# Patient Record
Sex: FEMALE | Race: BLACK OR AFRICAN AMERICAN | NOT HISPANIC OR LATINO | Employment: PART TIME | URBAN - METROPOLITAN AREA
[De-identification: names, ages, dates, MRNs, and addresses within clinical notes are randomized per-mention and may not be internally consistent; named-entity substitution may affect disease eponyms.]

---

## 2017-01-19 DIAGNOSIS — R10.13 DYSPEPSIA: Primary | ICD-10-CM

## 2017-01-19 NOTE — TELEPHONE ENCOUNTER
Date of last visit at clinic: 08.30.16    Please complete refill and CLOSE ENCOUNTER.  Closing the encounter signifies the refill is complete.

## 2017-01-22 RX ORDER — PANTOPRAZOLE SODIUM 40 MG/1
40 TABLET, DELAYED RELEASE ORAL DAILY
Qty: 30 TABLET | Refills: 3 | Status: SHIPPED
Start: 2017-01-22 | End: 2017-05-25

## 2017-02-27 ENCOUNTER — OFFICE VISIT (OUTPATIENT)
Dept: FAMILY MEDICINE | Facility: CLINIC | Age: 67
End: 2017-02-27

## 2017-02-27 VITALS
RESPIRATION RATE: 16 BRPM | HEART RATE: 88 BPM | TEMPERATURE: 98 F | BODY MASS INDEX: 35.14 KG/M2 | SYSTOLIC BLOOD PRESSURE: 144 MMHG | WEIGHT: 174 LBS | DIASTOLIC BLOOD PRESSURE: 87 MMHG | OXYGEN SATURATION: 95 %

## 2017-02-27 DIAGNOSIS — B18.1 CHRONIC VIRAL HEPATITIS B WITHOUT DELTA AGENT AND WITHOUT COMA (H): ICD-10-CM

## 2017-02-27 DIAGNOSIS — Z86.69 H/O VISUAL FIELD DEFECT: ICD-10-CM

## 2017-02-27 DIAGNOSIS — J30.89 SEASONAL ALLERGIC RHINITIS DUE TO OTHER ALLERGIC TRIGGER: ICD-10-CM

## 2017-02-27 DIAGNOSIS — R53.83 FATIGUE, UNSPECIFIED TYPE: Primary | ICD-10-CM

## 2017-02-27 LAB
% GRANULOCYTES: 54.7 %G (ref 40–75)
GRANULOCYTES #: 3.3 K/UL (ref 1.6–8.3)
HCT VFR BLD AUTO: 43.2 % (ref 35–47)
HEMOGLOBIN: 13.7 G/DL (ref 11.7–15.7)
LYMPHOCYTES # BLD AUTO: 2.2 K/UL (ref 0.8–5.3)
LYMPHOCYTES NFR BLD AUTO: 36.7 %L (ref 20–48)
MCH RBC QN AUTO: 28.5 PG (ref 26.5–35)
MCHC RBC AUTO-ENTMCNC: 31.7 G/DL (ref 32–36)
MCV RBC AUTO: 89.9 FL (ref 78–100)
MID #: 0.5 K/UL (ref 0–2.2)
MID %: 8.6 %M (ref 0–20)
PLATELET # BLD AUTO: 363 K/UL (ref 150–450)
RBC # BLD AUTO: 4.8 M/UL (ref 3.8–5.2)
WBC # BLD AUTO: 6 K/UL (ref 4–11)

## 2017-02-27 RX ORDER — LORATADINE 10 MG/1
10 TABLET ORAL DAILY
Qty: 30 TABLET | Refills: 1 | Status: SHIPPED | OUTPATIENT
Start: 2017-02-27 | End: 2017-10-04

## 2017-02-27 RX ORDER — FLUTICASONE PROPIONATE 50 MCG
1-2 SPRAY, SUSPENSION (ML) NASAL DAILY
Qty: 3 BOTTLE | Refills: 1 | Status: SHIPPED | OUTPATIENT
Start: 2017-02-27 | End: 2017-10-04

## 2017-02-27 NOTE — PROGRESS NOTES
HPI:       Dequan Capps is a 67 year old who presents for the following  Patient presents with:  Headache    Not feeling well for a while. Wants to check liver, kidney, diabetes, thyroid, infection. Has pain in throat, nose, ear.     Pain started from the throat, goes to the ear. Has dizziness. Sometimes loses balance. Feels falling down. Has not had a check up in more than a year. Keeps losing weight.  Has had these symptoms for the last 2 months. Sometimes feels ticklish, starts coughing. The pain then moves to the ears. Starts having dizziness.  Feels like decreased hearing in right ear. Sometimes when standing, she feels that she is not standing on flat floor. No sensation of room spinning. Overall symptoms were insidious in onset. Started with sore throat, hoarse voice, coughing. The cough is better. Still has the sore throat, ear pain, dizziness.     Wants to get liver panel done. Says that she gets it done every year.     A [employer]  was used for  this visit.      Problem, Medication and Allergy Lists were reviewed and are current.  Patient is an established patient of this clinic.         Review of Systems:   Review of Systems   Constitutional: Positive for fatigue and unexpected weight change. Negative for fever.   HENT: Positive for ear pain, hearing loss, sneezing and sore throat.    Respiratory: Positive for cough. Negative for shortness of breath.    Cardiovascular: Negative for chest pain.   Gastrointestinal: Negative for abdominal pain, nausea and vomiting.   Genitourinary: Negative for dysuria.   Neurological: Positive for dizziness. Negative for syncope.             Physical Exam:   Patient Vitals for the past 24 hrs:   BP Temp Temp src Pulse Resp SpO2 Weight   02/27/17 1622 144/87 - - - - - -   02/27/17 1621 157/82 98  F (36.7  C) Oral 88 16 95 % 174 lb (78.9 kg)     Body mass index is 35.14 kg/(m^2).  Vitals were reviewed and were normal     Physical Exam   Constitutional: She  appears well-developed.   HENT:   Head: Normocephalic and atraumatic.   Right Ear: External ear normal.   Left Ear: External ear normal.   Mouth/Throat: Posterior oropharyngeal erythema present.   Eyes: Conjunctivae are normal. Pupils are equal, round, and reactive to light.   Cardiovascular: Normal rate and normal heart sounds.    Pulmonary/Chest: Effort normal and breath sounds normal.   Abdominal: Soft. Bowel sounds are normal.   Musculoskeletal: Normal range of motion.   Neurological: She is alert. No cranial nerve deficit. Coordination normal.   Skin: Skin is warm.   Psychiatric: She has a normal mood and affect.         Results:     Component      Latest Ref Rng & Units 2/27/2017   Albumin      3.5 - 4.7 mg/dL 4.1   Alkaline Phosphatase      31.7 - 110.5 U/L 84.2   ALT      0.0 - 45.0 U/L 16.3   AST      0.0 - 45.0 U/L 19.7   Bilirubin Total      0.2 - 1.3 mg/dL <0.4   Urea Nitrogen      7.0 - 19.0 mg/dL 12.3   Calcium      8.5 - 10.1 mg/dL 8.9   Chloride      98.0 - 110.0 mmol/L 104.7   Carbon Dioxide      20.0 - 32.0 mmol/L 23.7   Creatinine      0.5 - 1.0 mg/dL 0.6   Glucose      70.0 - 99.0 mg'dL 94.6   Potassium      3.3 - 4.5 mmol/dL 3.5   Sodium      132.6 - 141.4 mmol/L 137.6   Protein Total      6.8 - 8.8 g/dL 7.6   GFR Estimate      mL/min/1.7 m2 106.6   GFR Estimate If Black      mL/min/1.7 m2 129.0   WBC      4.0 - 11.0 K/uL 6.0   Lymphocytes #      0.8 - 5.3 K/uL 2.2   % Lymphocytes      20.0 - 48.0 %L 36.7   Mid #      0.0 - 2.2 K/uL 0.5   Mid %      0.0 - 20.0 %M 8.6   GRANULOCYTES #      1.6 - 8.3 K/uL 3.3   % Granulocytes      40.0 - 75.0 %G 54.7   RBC      3.80 - 5.20 M/uL 4.80   Hemoglobin      11.7 - 15.7 g/dL 13.7   Hematocrit      35.0 - 47.0 % 43.2   MCV      78.0 - 100.0 fL 89.9   MCH      26.5 - 35.0 pg 28.5   MCHC      32.0 - 36.0 g/dL 31.7 (L)   Platelets      150.0 - 450.0 K/uL 363.0   Hemoglobin A1C      4.1 - 5.7 % 5.4   TSH      0.40 - 4.00 mU/L 2.75   Vitamin D Deficiency  "screening      20 - 75 ug/L 26     Assessment and Plan     1. Fatigue, unspecified type  2. Chronic viral hepatitis B without delta agent and without coma (H)  3. Seasonal allergic rhinitis due to other allergic trigger  Patient here with multiple vague complaints. Demanding an extensive workup. Her exam is not suggestive of any underlying pathology. Her lab work up has returned normal. Most likely suffering from a viral syndrome vs allergic rhinitis, pharyngitis. Tried to point this out to patient but she still requested work up. Treatment for allergic rhinitis prescribed. Return to clinic if symptoms do not improve or worsen.     - CBC with Diff Plt ([facility])  - Hemoglobin A1c ([facility])  - TSH with free T4 reflex  - Vitamin D Deficiency  - Comprehensive Metabolic Panel ([facility])  - loratadine (CLARITIN) 10 MG tablet; Take 1 tablet (10 mg) by mouth daily  Dispense: 30 tablet; Refill: 1  - fluticasone (FLONASE) 50 MCG/ACT spray; Spray 1-2 sprays into both nostrils daily  Dispense: 3 Bottle; Refill: 1    4. H/O visual field defect  Patient was last seen by opthalmology in Sept 2016. Recommendation was to get an MRI done and then follow up. I am worried that her visual impairment has some contribution to her feeling of imbalance. Her neurologic exam was negative for any cerebellar disease. She was unable to go through MRI because she says she \"felt scared\". Offered to prescribe valium to take prior to the MRI, patient refused. She says she wants to give it another chance. Stressed on the importance of getting the MRI and follow up with opthalmology.    There are no discontinued medications.  Options for treatment and follow-up care were reviewed with the patient. Dequan Capps  engaged in the decision making process and verbalized understanding of the options discussed and agreed with the final plan.    Bridgette Méndez MD      "

## 2017-02-27 NOTE — LETTER
March 3, 2017      Dequan Capps  [address] [address]        Dear Dequan,    Thank you for getting your care at [facility]. Please see below for your test results.    Resulted Orders   CBC with Diff Plt (\[facility] [place] [facility]\"")   Result Value Ref Range    WBC 6.0 4.0 - 11.0 K/uL    Lymphocytes # 2.2 0.8 - 5.3 K/uL    % Lymphocytes 36.7 20.0 - 48.0 %L    Mid # 0.5 0.0 - 2.2 K/uL    Mid % 8.6 0.0 - 20.0 %M    GRANULOCYTES # 3.3 1.6 - 8.3 K/uL    % Granulocytes 54.7 40.0 - 75.0 %G    RBC 4.80 3.80 - 5.20 M/uL    Hemoglobin 13.7 11.7 - 15.7 g/dL    Hematocrit 43.2 35.0 - 47.0 %    MCV 89.9 78.0 - 100.0 fL    MCH 28.5 26.5 - 35.0 pg    MCHC 31.7 (L) 32.0 - 36.0 g/dL    Platelets 363.0 150.0 - 450.0 K/uL   Hemoglobin A1c (\[facility]\"")   Result Value Ref Range    Hemoglobin A1C 5.4 4.1 - 5.7 %   Comprehensive Metabolic Panel (\[facility]\"")   Result Value Ref Range    Albumin 4.1 3.5 - 4.7 mg/dL    Alkaline Phosphatase 84.2 31.7 - 110.5 U/L    ALT 16.3 0.0 - 45.0 U/L    AST 19.7 0.0 - 45.0 U/L    Bilirubin Total <0.4 0.2 - 1.3 mg/dL    Urea Nitrogen 12.3 7.0 - 19.0 mg/dL    Calcium 8.9 8.5 - 10.1 mg/dL    Chloride 104.7 98.0 - 110.0 mmol/L    Carbon Dioxide 23.7 20.0 - 32.0 mmol/L    Creatinine 0.6 0.5 - 1.0 mg/dL    Glucose 94.6 70.0 - 99.0 mg'dL    Potassium 3.5 3.3 - 4.5 mmol/dL    Sodium 137.6 132.6 - 141.4 mmol/L    Protein Total 7.6 6.8 - 8.8 g/dL    GFR Estimate 106.6 mL/min/1.7 m2    GFR Estimate If Black 129.0 mL/min/1.7 m2   TSH with free T4 reflex   Result Value Ref Range    TSH 2.75 0.40 - 4.00 mU/L   Vitamin D Deficiency   Result Value Ref Range    Vitamin D Deficiency screening 26 20 - 75 ug/L      Comment:      Season, race, dietary intake, and treatment affect the concentration of   25-hydroxy-Vitamin D. Values may decrease during winter months and increase   during summer months. Values 20-29 ug/L may indicate Vitamin D insufficiency   and values <20 ug/L may indicate Vitamin D deficiency.    Vitamin D determination is routinely performed by an immunoassay specific for   25 hydroxyvitamin D3.  If an individual is on vitamin D2 (ergocalciferol)   supplementation, please specify 25 OH vitamin D2 and D3 level determination   by   LCMSMS test VITD23.         If you have any concerns about these results please call and leave a message for me or send a MyChart message to the clinic.    Sincerely,    Bridgette Méndez MD

## 2017-02-27 NOTE — MR AVS SNAPSHOT
After Visit Summary   2/27/2017    Dequan Capps    MRN: [id number]           Patient Information     Date Of Birth          1950        Visit Information        Provider Department      2/27/2017 4:20 PM Bridgette Méndez MD [facility]        Today's Diagnoses     Fatigue, unspecified type    -  1    Chronic viral hepatitis B without delta agent and without coma (H)        H/O visual field defect        Seasonal allergic rhinitis due to other allergic trigger          Care Instructions    Here is the plan from today's visit    1. Fatigue, unspecified type    - CBC with Diff Plt ([facility])  - Hemoglobin A1c ([facility])  - TSH with free T4 reflex  - Vitamin D Deficiency    2. Chronic viral hepatitis B without delta agent and without coma (H)    - Comprehensive Metabolic Panel ([facility])    3. H/O visual field defect      4. Seasonal allergic rhinitis due to other allergic trigger    - loratadine (CLARITIN) 10 MG tablet; Take 1 tablet (10 mg) by mouth daily  Dispense: 30 tablet; Refill: 1  - fluticasone (FLONASE) 50 MCG/ACT spray; Spray 1-2 sprays into both nostrils daily  Dispense: 3 Bottle; Refill: 1          Please call or return to clinic if your symptoms don't go away.    Follow up plan  Please make a clinic appointment for follow up with your primary physician Kimberly Green to follow up with results.    Thank you for coming to [facility] today.  Lab Testing:  **If you had lab testing today and your results are reassuring or normal they will be mailed to you or sent through [employer] within 7 days.   **If the lab tests need quick action we will call you with the results.  The phone number we will call with results is # [phone] (home) none (work). If this is not the best number please call our clinic and change the number.  Medication Refills:  If you need any refills please call your pharmacy and they will contact us.   If you need to  your refill at a new  pharmacy, please contact the new pharmacy directly. The new pharmacy will help you get your medications transferred faster.   Scheduling:  If you have any concerns about today's visit or wish to schedule another appointment please call our office during normal business hours [phone] (8-5:00 M-F)  If a referral was made to a [facility] and you don't get a call from central scheduling please call [phone].  If a Mammogram was ordered for you at [facility] call [phone] to schedule or change your appointment.  If you had an XRay/CT/Ultrasound/MRI ordered the number is [phone] to schedule or change your radiology appointment.   Medical Concerns:  If you have urgent medical concerns please call [phone] at any time of the day.  If you have a medical emergency please call [phone].          Follow-ups after your visit        Who to contact     Please call your clinic at [phone] to:    Ask questions about your health    Make or cancel appointments    Discuss your medicines    Learn about your test results    Speak to your doctor   If you have compliments or concerns about an experience at your clinic, or if you wish to file a complaint, please contact [facility] Patient Relations at [phone] or email us at [email or web address]         Additional Information About Your Visit        MyChart Information     [employer] is an electronic gateway that provides easy, online access to your medical records. With [employer], you can request a clinic appointment, read your test results, renew a prescription or communicate with your care team.     To sign up for [employer] visit the website at [email or web address]   You will be asked to enter the access code listed below, as well as some personal information. Please follow the directions to create your username and password.     Your access code is: [email or web address]  Expires: 5/28/2017  5:43 PM      Your access code will  in 90 days. If you need help or a new code, please contact your [facility] or call [phone] for assistance.        Care EveryWhere ID     This is your Care EveryWhere ID. This could be used by other organizations to access your [facility] medical records  [id number]        Your Vitals Were     Pulse Temperature Respirations Pulse Oximetry Breastfeeding? BMI (Body Mass Index)    88 98  F (36.7  C) (Oral) 16 95% No 35.14 kg/m2       Blood Pressure from Last 3 Encounters:   17 144/87   16 126/86   16 137/78    Weight from Last 3 Encounters:   17 174 lb (78.9 kg)   16 174 lb 12.8 oz (79.3 kg)   16 176 lb 9.6 oz (80.1 kg)              We Performed the Following     CBC with Diff Plt ([facility])     Comprehensive Metabolic Panel ([facility])     Hemoglobin A1c ([facility])     TSH with free T4 reflex     Vitamin D Deficiency          Today's Medication Changes          These changes are accurate as of: 17  5:43 PM.  If you have any questions, ask your nurse or doctor.               Start taking these medicines.        Dose/Directions    loratadine 10 MG tablet   Commonly known as:  CLARITIN   Used for:  Seasonal allergic rhinitis due to other allergic trigger   Started by:  Bridgette Méndez MD        Dose:  10 mg   Take 1 tablet (10 mg) by mouth daily   Quantity:  30 tablet   Refills:  1         These medicines have changed or have updated prescriptions.        Dose/Directions    * fluticasone 50 MCG/ACT spray   Commonly known as:  FLONASE   This may have changed:  Another medication with the same name was added. Make sure you understand how and when to take each.   Used for:  Sinus headache   Changed by:  Kimberly Green MD        Dose:  1-2 spray   Spray 1-2 sprays into both nostrils daily   Quantity:  16 g   Refills:  3       * fluticasone 50 MCG/ACT spray   Commonly known as:  FLONASE   This may have changed:   You were already taking a medication with the same name, and this prescription was added. Make sure you understand how and when to take each.   Used for:  Seasonal allergic rhinitis due to other allergic trigger   Changed by:  Bridgette Méndez MD        Dose:  1-2 spray   Spray 1-2 sprays into both nostrils daily   Quantity:  3 Bottle   Refills:  1       * Notice:  This list has 2 medication(s) that are the same as other medications prescribed for you. Read the directions carefully, and ask your doctor or other care provider to review them with you.         Where to get your medicines      These medications were sent to [facility] [address]  [address] [facility] [address]     Phone:  [phone]     fluticasone 50 MCG/ACT spray    loratadine 10 MG tablet                Primary Care Provider Office Phone # Fax #    Kimberly Green [phone]       [facility] [address]  [facility] [address]        Thank you!     Thank you for choosing [facility]  for your care. Our goal is always to provide you with excellent care. Hearing back from our patients is one way we can continue to improve our services. Please take a few minutes to complete the written survey that you may receive in the mail after your visit with us. Thank you!             Your Updated Medication List - Protect others around you: Learn how to safely use, store and throw away your medicines at www.disposemymeds.org.          This list is accurate as of: 2/27/17  5:43 PM.  Always use your most recent med list.                   Brand Name Dispense Instructions for use    acetaminophen 500 MG tablet    TYLENOL    60 tablet    Take 1-2 tablets (500-1,000 mg) by mouth every 6 hours as needed       amLODIPine 5 MG tablet    NORVASC    30 tablet    Take 1 tablet (5 mg) by mouth daily       cyclobenzaprine 5 MG tablet    FLEXERIL    20 tablet    Take 1  tablet (5 mg) by mouth nightly as needed for muscle spasms       DAILY ENOC Tabs     30 tablet    Take 1 tablet by mouth daily       * fluticasone 50 MCG/ACT spray    FLONASE    16 g    Spray 1-2 sprays into both nostrils daily       * fluticasone 50 MCG/ACT spray    FLONASE    3 Bottle    Spray 1-2 sprays into both nostrils daily       ibuprofen 600 MG tablet    ADVIL/MOTRIN    60 tablet    Take 1 tablet (600 mg) by mouth every 6 hours as needed for moderate pain       loratadine 10 MG tablet    CLARITIN    30 tablet    Take 1 tablet (10 mg) by mouth daily       pantoprazole 40 MG EC tablet    PROTONIX    30 tablet    Take 1 tablet (40 mg) by mouth daily Take 30-60 minutes before a meal.       polyvinyl alcohol 1.4 % ophthalmic solution    ARTIFICIAL TEARS    15 mL    Place 1 drop into both eyes as needed for dry eyes       potassium chloride 20 MEQ Packet    KLOR-CON    30 packet    Take 20 mEq by mouth daily       potassium chloride SA 20 MEQ CR tablet    K-DUR/KLOR-CON M     Take 20 mEq by mouth       * vitamin D 2000 UNITS Caps     90 capsule    Take 1 tablet by mouth daily.       * vitamin D 2000 UNITS tablet     100 tablet    Take 2,000 Units by mouth daily       * Notice:  This list has 4 medication(s) that are the same as other medications prescribed for you. Read the directions carefully, and ask your doctor or other care provider to review them with you.

## 2017-02-27 NOTE — PATIENT INSTRUCTIONS
Here is the plan from today's visit    1. Fatigue, unspecified type    - CBC with Diff Plt ([facility])  - Hemoglobin A1c ([facility])  - TSH with free T4 reflex  - Vitamin D Deficiency    2. Chronic viral hepatitis B without delta agent and without coma (H)    - Comprehensive Metabolic Panel ([facility])    3. H/O visual field defect      4. Seasonal allergic rhinitis due to other allergic trigger    - loratadine (CLARITIN) 10 MG tablet; Take 1 tablet (10 mg) by mouth daily  Dispense: 30 tablet; Refill: 1  - fluticasone (FLONASE) 50 MCG/ACT spray; Spray 1-2 sprays into both nostrils daily  Dispense: 3 Bottle; Refill: 1          Please call or return to clinic if your symptoms don't go away.    Follow up plan  Please make a clinic appointment for follow up with your primary physician Kimberly Green to follow up with results.    Thank you for coming to [facility] today.  Lab Testing:  **If you had lab testing today and your results are reassuring or normal they will be mailed to you or sent through [employer] within 7 days.   **If the lab tests need quick action we will call you with the results.  The phone number we will call with results is # [phone] (home) none (work). If this is not the best number please call our clinic and change the number.  Medication Refills:  If you need any refills please call your pharmacy and they will contact us.   If you need to  your refill at a new pharmacy, please contact the new pharmacy directly. The new pharmacy will help you get your medications transferred faster.   Scheduling:  If you have any concerns about today's visit or wish to schedule another appointment please call our office during normal business hours [phone] (8-5:00 M-F)  If a referral was made to a [facility] and you don't get a call from central scheduling please call [phone].  If a Mammogram was ordered for you at [facility] call [phone] to schedule or  change your appointment.  If you had an XRay/CT/Ultrasound/MRI ordered the number is [phone] to schedule or change your radiology appointment.   Medical Concerns:  If you have urgent medical concerns please call [phone] at any time of the day.  If you have a medical emergency please call [phone].

## 2017-02-27 NOTE — PROGRESS NOTES
Preceptor Attestation:   Patient seen and discussed with the resident.   Assessment and plan reviewed with resident and agreed upon.   Supervising Physician:  Carlos Rice MD  [facility]

## 2017-02-28 LAB
ALBUMIN SERPL-MCNC: 4.1 MG/DL (ref 3.5–4.7)
ALP SERPL-CCNC: 84.2 U/L (ref 31.7–110.5)
ALT SERPL-CCNC: 16.3 U/L (ref 0–45)
AST SERPL-CCNC: 19.7 U/L (ref 0–45)
BILIRUB SERPL-MCNC: <0.4 MG/DL (ref 0.2–1.3)
BUN SERPL-MCNC: 12.3 MG/DL (ref 7–19)
CALCIUM SERPL-MCNC: 8.9 MG/DL (ref 8.5–10.1)
CHLORIDE SERPLBLD-SCNC: 104.7 MMOL/L (ref 98–110)
CO2 SERPL-SCNC: 23.7 MMOL/L (ref 20–32)
CREAT SERPL-MCNC: 0.6 MG/DL (ref 0.5–1)
DEPRECATED CALCIDIOL+CALCIFEROL SERPL-MC: 26 UG/L (ref 20–75)
GFR SERPL CREATININE-BSD FRML MDRD: 106.6 ML/MIN/1.7 M2
GLUCOSE SERPL-MCNC: 94.6 MG'DL (ref 70–99)
HBA1C MFR BLD: 5.4 % (ref 4.1–5.7)
POTASSIUM SERPL-SCNC: 3.5 MMOL/DL (ref 3.3–4.5)
PROT SERPL-MCNC: 7.6 G/DL (ref 6.8–8.8)
SODIUM SERPL-SCNC: 137.6 MMOL/L (ref 132.6–141.4)
TSH SERPL DL<=0.005 MIU/L-ACNC: 2.75 MU/L (ref 0.4–4)

## 2017-03-04 ASSESSMENT — ENCOUNTER SYMPTOMS
ABDOMINAL PAIN: 0
DIZZINESS: 1
VOMITING: 0
SORE THROAT: 1
DYSURIA: 0
COUGH: 1
FATIGUE: 1
FEVER: 0
SHORTNESS OF BREATH: 0
UNEXPECTED WEIGHT CHANGE: 1
NAUSEA: 0

## 2017-03-06 ENCOUNTER — TELEPHONE (OUTPATIENT)
Dept: FAMILY MEDICINE | Facility: CLINIC | Age: 67
End: 2017-03-06

## 2017-03-10 ENCOUNTER — TELEPHONE (OUTPATIENT)
Dept: FAMILY MEDICINE | Facility: CLINIC | Age: 67
End: 2017-03-10

## 2017-03-15 NOTE — TELEPHONE ENCOUNTER
Had , call the patient again, to inform of upcoming MRI appointment scheduled for 3/17/17  Velma Cardoso MA

## 2017-04-14 ENCOUNTER — TELEPHONE (OUTPATIENT)
Dept: FAMILY MEDICINE | Facility: CLINIC | Age: 67
End: 2017-04-14

## 2017-04-14 NOTE — TELEPHONE ENCOUNTER
[facility] phone call message- patient requesting a refill:    Full Medication Name: amLODIPine (NORVASC) 5 MG tablet and Cholecalciferol (VITAMIN D) 2000 UNITS CAPS and     Dose:     Pharmacy confirmed as   [email or web address] - [place], [place] - [address]  [address] [facility] [address]  Phone: [phone] Fax: [phone]    [facility] [address] [address]  Phone: [phone] Fax: [phone]  : Yes    Additional Comments:  Patient is requesting refill of above medications.    OK to leave a message on voice mail? Yes    Primary language: Turkish      needed? Yes    Call taken on April 14, 2017 at 2:20 PM by Patience Danielle

## 2017-04-17 DIAGNOSIS — E55.9 AVITAMINOSIS D: ICD-10-CM

## 2017-04-17 DIAGNOSIS — I10 BENIGN ESSENTIAL HYPERTENSION: ICD-10-CM

## 2017-04-17 RX ORDER — MULTIVIT-MIN/IRON/FOLIC ACID/K 18-600-40
1 CAPSULE ORAL DAILY
Qty: 90 CAPSULE | Refills: 1 | Status: SHIPPED | OUTPATIENT
Start: 2017-04-17 | End: 2017-10-04

## 2017-04-17 RX ORDER — AMLODIPINE BESYLATE 5 MG/1
5 TABLET ORAL DAILY
Qty: 30 TABLET | Refills: 6 | Status: SHIPPED | OUTPATIENT
Start: 2017-04-17 | End: 2017-12-05

## 2017-05-25 DIAGNOSIS — R10.13 DYSPEPSIA: ICD-10-CM

## 2017-05-25 RX ORDER — PANTOPRAZOLE SODIUM 40 MG/1
40 TABLET, DELAYED RELEASE ORAL DAILY
Qty: 30 TABLET | Refills: 3 | Status: SHIPPED | OUTPATIENT
Start: 2017-05-25 | End: 2018-01-02

## 2017-05-25 NOTE — TELEPHONE ENCOUNTER
Request for medication refill:    Date of last visit at clinic: 2/27/17    Please complete refill if appropriate and CLOSE ENCOUNTER.    Closing the encounter signifies the refill is complete.    If refill has been denied, please complete the smart phrase .smirefuse and route it to the [facility] RN TRIAGE pool to inform the patient and the pharmacy.    Mary Oneal

## 2017-09-26 ENCOUNTER — OFFICE VISIT (OUTPATIENT)
Dept: FAMILY MEDICINE | Facility: CLINIC | Age: 67
End: 2017-09-26

## 2017-09-26 VITALS
HEART RATE: 80 BPM | BODY MASS INDEX: 35.14 KG/M2 | OXYGEN SATURATION: 97 % | DIASTOLIC BLOOD PRESSURE: 85 MMHG | TEMPERATURE: 97.6 F | RESPIRATION RATE: 18 BRPM | SYSTOLIC BLOOD PRESSURE: 139 MMHG | WEIGHT: 174 LBS

## 2017-09-26 DIAGNOSIS — H04.129 EYE DRYNESS: ICD-10-CM

## 2017-09-26 DIAGNOSIS — M25.50 MULTIPLE JOINT PAIN: Primary | ICD-10-CM

## 2017-09-26 DIAGNOSIS — H53.8 BLURRY VISION, RIGHT EYE: ICD-10-CM

## 2017-09-26 DIAGNOSIS — M79.10 MYALGIA: ICD-10-CM

## 2017-09-26 DIAGNOSIS — H57.11 EYE PAIN, RIGHT: ICD-10-CM

## 2017-09-26 DIAGNOSIS — Z00.00 HEALTH CARE MAINTENANCE: ICD-10-CM

## 2017-09-26 DIAGNOSIS — R53.82 CHRONIC FATIGUE: ICD-10-CM

## 2017-09-26 LAB
ALBUMIN SERPL-MCNC: 4.3 MG/DL (ref 3.5–4.7)
ALP SERPL-CCNC: 78.6 U/L (ref 31.7–110.5)
ALT SERPL-CCNC: 15.4 U/L (ref 0–45)
AST SERPL-CCNC: 17.6 U/L (ref 0–45)
BILIRUB SERPL-MCNC: 0.4 MG/DL (ref 0.2–1.3)
BUN SERPL-MCNC: 7.5 MG/DL (ref 7–19)
CALCIUM SERPL-MCNC: 9.1 MG/DL (ref 8.5–10.1)
CHLORIDE SERPLBLD-SCNC: 103.3 MMOL/L (ref 98–110)
CO2 SERPL-SCNC: 27.4 MMOL/L (ref 20–32)
CREAT SERPL-MCNC: 0.6 MG/DL (ref 0.5–1)
CRP SERPL-MCNC: 9.6 MG/L (ref 0–8)
DEPRECATED CALCIDIOL+CALCIFEROL SERPL-MC: 28 UG/L (ref 20–75)
ERYTHROCYTE [SEDIMENTATION RATE] IN BLOOD: 10 MM/HR (ref 0–30)
GFR SERPL CREATININE-BSD FRML MDRD: >90 ML/MIN/1.7 M2
GLUCOSE SERPL-MCNC: 104.2 MG'DL (ref 70–99)
HBA1C MFR BLD: 5.5 % (ref 4.1–5.7)
HCT VFR BLD AUTO: 43.9 % (ref 35–47)
HEMOGLOBIN: 14.2 G/DL (ref 11.7–15.7)
MCH RBC QN AUTO: 29.5 PG (ref 26.5–35)
MCHC RBC AUTO-ENTMCNC: 32.3 G/DL (ref 32–36)
MCV RBC AUTO: 91 FL (ref 78–100)
PLATELET # BLD AUTO: 355 K/UL (ref 150–450)
POTASSIUM SERPL-SCNC: 3.5 MMOL/DL (ref 3.3–4.5)
PROT SERPL-MCNC: 7.6 G/DL (ref 6.8–8.8)
RBC # BLD AUTO: 4.82 M/UL (ref 3.8–5.2)
SODIUM SERPL-SCNC: 137.7 MMOL/L (ref 132.6–141.4)
TSH SERPL DL<=0.005 MIU/L-ACNC: 2.83 MU/L (ref 0.4–4)
WBC # BLD AUTO: 4.9 K/UL (ref 4–11)

## 2017-09-26 RX ORDER — MULTIVITAMIN
1 TABLET ORAL DAILY
Qty: 30 TABLET | Refills: 0 | Status: SHIPPED | OUTPATIENT
Start: 2017-09-26 | End: 2017-10-04

## 2017-09-26 RX ORDER — CHOLECALCIFEROL (VITAMIN D3) 50 MCG
2000 TABLET ORAL DAILY
Qty: 100 TABLET | Refills: 3 | Status: SHIPPED | OUTPATIENT
Start: 2017-09-26

## 2017-09-26 RX ORDER — POLYVINYL ALCOHOL 14 MG/ML
1 SOLUTION/ DROPS OPHTHALMIC PRN
Qty: 15 ML | Refills: 3 | Status: SHIPPED | OUTPATIENT
Start: 2017-09-26

## 2017-09-26 NOTE — PROGRESS NOTES
Preceptor Attestation:   Patient seen and discussed with the resident.   Assessment and plan reviewed with resident and agreed upon.   Supervising Physician:  Carlos Rice MD  [facility]

## 2017-09-26 NOTE — MR AVS SNAPSHOT
After Visit Summary   9/26/2017    Dequan Capps    MRN: [id number]           Patient Information     Date Of Birth          1950        Visit Information        Provider Department      9/26/2017 9:40 AM Donte Wasserman MD [facility]        Today's Diagnoses     Multiple joint pain    -  1    Benign essential hypertension        Health care maintenance        Eye pain, right        Blurry vision, right eye        Eye dryness        Myalgia        Chronic fatigue          Care Instructions    Here is the plan from today's visit    Body aches:  Today we will do some lab work to check for your body aches.  You are due for a DEXA scan which looks at how strong your bones are.      Benign essential hypertension  - Multiple Vitamin (DAILY ENOC) TABS; Take 1 tablet by mouth daily  Dispense: 30 tablet; Refill: 0     Health care maintenance  - Cholecalciferol (VITAMIN D) 2000 UNITS tablet; Take 2,000 Units by mouth daily  Dispense: 100 tablet; Refill: 3    Shots:  Today you received the Tdap and pneumonia shot    Please call or return to clinic if your symptoms don't go away.    Follow up plan as needed    Thank you for coming to [facility] today.  Lab Testing:  **If you had lab testing today and your results are reassuring or normal they will be mailed to you or sent through [employer] within 7 days.   **If the lab tests need quick action we will call you with the results.  The phone number we will call with results is # [phone] (home) none (work). If this is not the best number please call our clinic and change the number.  Medication Refills:  If you need any refills please call your pharmacy and they will contact us.   If you need to  your refill at a new pharmacy, please contact the new pharmacy directly. The new pharmacy will help you get your medications transferred faster.   Scheduling:  If you have any concerns about today's visit or wish to schedule another  appointment please call our office during normal business hours [phone] (8-5:00 M-F)  If a referral was made to a [facility] and you don't get a call from central scheduling please call [phone].  If a Mammogram was ordered for you at [facility] call [phone] to schedule or change your appointment.  If you had an XRay/CT/Ultrasound/MRI ordered the number is [phone] to schedule or change your radiology appointment.   Medical Concerns:  If you have urgent medical concerns please call [phone] at any time of the day.            Follow-ups after your visit        Future tests that were ordered for you today     Open Future Orders        Priority Expected Expires Ordered    DX Hip/Pelvis/Spine (DEXA) Routine  2018            Who to contact     Please call your clinic at [phone] to:    Ask questions about your health    Make or cancel appointments    Discuss your medicines    Learn about your test results    Speak to your doctor   If you have compliments or concerns about an experience at your clinic, or if you wish to file a complaint, please contact [facility] Patient Relations at [phone] or email us at [email or web address]         Additional Information About Your Visit        [employer]anes[employer] Information     [employer] (formerly [employer]) is an electronic gateway that provides easy, online access to your medical records. With [employer] (formerly [employer]), you can request a clinic appointment, read your test results, renew a prescription or communicate with your care team.     To sign up for [employer] visit the website at [email or web address]   You will be asked to enter the access code listed below, as well as some personal information. Please follow the directions to create your username and password.     Your access code is: [email or web address]  Expires: 2017 10:17 AM     Your access code will  in 90 days. If you need help or a new code, please  contact your [facility] or call [phone] for assistance.        Care EveryWhere ID     This is your Care EveryWhere ID. This could be used by other organizations to access your [facility] medical records  [id number]        Your Vitals Were     Pulse Temperature Respirations Pulse Oximetry BMI (Body Mass Index)       80 97.6  F (36.4  C) (Oral) 18 97% 35.14 kg/m2        Blood Pressure from Last 3 Encounters:   09/26/17 139/85   02/27/17 144/87   08/30/16 126/86    Weight from Last 3 Encounters:   09/26/17 174 lb (78.9 kg)   02/27/17 174 lb (78.9 kg)   08/30/16 174 lb 12.8 oz (79.3 kg)              We Performed the Following     CBC with Plt ([facility])     Comprehensive Metabolic Panel ([facility])     CRP inflammation     Erythrocyte Sedimentation Rate ([facility])     Hemoglobin A1c ([facility])     TSH with free T4 reflex     Vitamin D Level          Where to get your medicines      These medications were sent to [facility], [address] [address]     Phone:  [phone]     DAILY ENOC Tabs    polyvinyl alcohol 1.4 % ophthalmic solution    vitamin D 2000 UNITS tablet          Primary Care Provider Office Phone # Fax #    Sonia Zarate [phone]       [facility] [address] [facility] [address]  [facility] [address]        Equal Access to Services     COSTA MYERS AH: Hadii ifeanyi atwoodo Soherminio, waaxda luqadaha, qaybta kaalmada cheli, wax dao jones. So [facility] [phone].    ATENCIÓN: Si habla español, tiene a lauren disposición servicios gratuitos de asistencia lingüística. Llame al [phone].    We comply with applicable federal civil rights laws and [place] laws. We do not discriminate on the basis of race, color, national origin, age, disability sex, sexual orientation or gender identity.            Thank you!     Thank you for choosing [facility]  for your care. Our goal is always to provide you with excellent care. Hearing back from our patients is one way we can continue to improve our services. Please take a few minutes to complete the written survey that you may receive in the mail after your visit with us. Thank you!             Your Updated Medication List - Protect others around you: Learn how to safely use, store and throw away your medicines at www.disposemymeds.org.          This list is accurate as of: 9/26/17 10:17 AM.  Always use your most recent med list.                   Brand Name Dispense Instructions for use Diagnosis    acetaminophen 500 MG tablet    TYLENOL    60 tablet    Take 1-2 tablets (500-1,000 mg) by mouth every 6 hours as needed    Headache(784.0)       amLODIPine 5 MG tablet    NORVASC    30 tablet    Take 1 tablet (5 mg) by mouth daily    Benign essential hypertension       cyclobenzaprine 5 MG tablet    FLEXERIL    20 tablet    Take 1 tablet (5 mg) by mouth nightly as needed for muscle spasms    Neck pain       DAILY ENOC Tabs     30 tablet    Take 1 tablet by mouth daily    Benign essential hypertension       * fluticasone 50 MCG/ACT spray    FLONASE    16 g    Spray 1-2 sprays into both nostrils daily    Sinus headache       * fluticasone 50 MCG/ACT spray    FLONASE    3 Bottle    Spray 1-2 sprays into both nostrils daily    Seasonal allergic rhinitis due to other allergic trigger       ibuprofen 600 MG tablet    ADVIL/MOTRIN    60 tablet    Take 1 tablet (600 mg) by mouth every 6 hours as needed for moderate pain    Right knee pain       loratadine 10 MG tablet    CLARITIN    30 tablet    Take 1 tablet (10 mg) by mouth daily    Seasonal allergic rhinitis due to other allergic trigger       pantoprazole 40 MG EC tablet    PROTONIX    30 tablet    Take 1 tablet (40 mg) by mouth daily Take 30-60 minutes before a meal.    Dyspepsia       polyvinyl alcohol 1.4 % ophthalmic solution    ARTIFICIAL TEARS     15 mL    Place 1 drop into both eyes as needed for dry eyes    Eye pain, right, Blurry vision, right eye, Eye dryness       potassium chloride 20 MEQ Packet    KLOR-CON    30 packet    Take 20 mEq by mouth daily    Benign essential hypertension       potassium chloride SA 20 MEQ CR tablet    K-DUR/KLOR-CON M     Take 20 mEq by mouth        * vitamin D 2000 UNITS Caps     90 capsule    Take 1 tablet by mouth daily    Avitaminosis D       * vitamin D 2000 UNITS tablet     100 tablet    Take 2,000 Units by mouth daily    Health care maintenance       * Notice:  This list has 4 medication(s) that are the same as other medications prescribed for you. Read the directions carefully, and ask your doctor or other care provider to review them with you.

## 2017-09-26 NOTE — PROGRESS NOTES
"      HPI:       Dequan Capps is a 67 year old who presents for the following  Patient presents with:  Dizziness: 1 month, lightheaded, weak, anxious  Musculoskeletal Problem: entire body, joint pain    Dequan reports 2 months of muscle and joint pain with dizziness and fatigue.   She states \"all my joints, all of them, everywhere,\" cause her pain. She then began to point at every joint to emphasize that each joint is painful. Joint pain is worse in both her knees with swelling at times. She reports morning stiffness in her knees, lasting 30mins-1hr.    Additional symptoms include sensation of heart skipping a beat, especially with physical exertion, chronic fatigue \"always tired.\" No skin rash    Immigrated from [place] in July 2004. 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     CCP and RF normal 12/23/2014    A [facility]  was used for  this visit.      Problem, Medication and Allergy Lists were reviewed and are current.  Patient is an established patient of this clinic.         Review of Systems:   Review of Systems   Constitutional: Positive for fatigue. Negative for chills, fever and unexpected weight change.   HENT: Negative for congestion, rhinorrhea and sore throat.    Eyes: Positive for visual disturbance (right eye).   Respiratory: Negative for cough and shortness of breath.    Cardiovascular: Negative for chest pain.   Gastrointestinal: Negative for abdominal pain, constipation, diarrhea, nausea and vomiting.   Musculoskeletal: Positive for arthralgias, back pain, joint swelling (knees bilaterally) and myalgias.   Skin: Negative for rash.   Neurological: Positive for dizziness. Negative for syncope, weakness, numbness and headaches.   Psychiatric/Behavioral: The patient is nervous/anxious (anxious regarding health concerns).     "          Physical Exam:   Patient Vitals for the past 24 hrs:   BP Temp Temp src Pulse Resp SpO2 Weight   09/26/17 0940 139/85 97.6  F (36.4  C) Oral 80 18 97 % 174 lb (78.9 kg)     Body mass index is 35.14 kg/(m^2).  Vital signs normal except BMI     Physical Exam   Constitutional: She is oriented to person, place, and time. No distress.   HENT:   Head: Normocephalic and atraumatic.   Cardiovascular: Normal rate, regular rhythm and normal heart sounds.  Exam reveals no gallop and no friction rub.    No murmur heard.  Pulmonary/Chest: Effort normal and breath sounds normal. No respiratory distress. She has no wheezes. She has no rales.   Musculoskeletal: Normal range of motion. She exhibits no tenderness.   Neurological: She is alert and oriented to person, place, and time.   Skin: She is not diaphoretic.   Psychiatric: She has a normal mood and affect. Her behavior is normal. Judgment and thought content normal.       Results:     Results for orders placed or performed in visit on 09/26/17   Comprehensive Metabolic Panel ([facility])   Result Value Ref Range    Albumin 4.3 3.5 - 4.7 mg/dL    Alkaline Phosphatase 78.6 31.7 - 110.5 U/L    ALT 15.4 0.0 - 45.0 U/L    AST 17.6 0.0 - 45.0 U/L    Bilirubin Total 0.4 0.2 - 1.3 mg/dL    Urea Nitrogen 7.5 7.0 - 19.0 mg/dL    Calcium 9.1 8.5 - 10.1 mg/dL    Chloride 103.3 98.0 - 110.0 mmol/L    Carbon Dioxide 27.4 20.0 - 32.0 mmol/L    Creatinine 0.6 0.5 - 1.0 mg/dL    Glucose 104.2 (H) 70.0 - 99.0 mg'dL    Potassium 3.5 3.3 - 4.5 mmol/dL    Sodium 137.7 132.6 - 141.4 mmol/L    Protein Total 7.6 6.8 - 8.8 g/dL    GFR Estimate >90 >60.0 mL/min/1.7 m2    GFR Estimate If Black >90 >60.0 mL/min/1.7 m2   CBC with Plt ([facility])   Result Value Ref Range    WBC 4.9 4.0 - 11.0 K/uL    RBC 4.82 3.80 - 5.20 M/uL    Hemoglobin 14.2 11.7 - 15.7 g/dL    Hematocrit 43.9 35.0 - 47.0 %    MCV 91.0 78.0 - 100.0 fL    MCH 29.5 26.5 - 35.0 pg    MCHC 32.3 32.0 - 36.0 g/dL    Platelets 355.0 150.0  - 450.0 K/uL   TSH with free T4 reflex   Result Value Ref Range    TSH 2.83 0.40 - 4.00 mU/L   Erythrocyte Sedimentation Rate ([facility])   Result Value Ref Range    Sed Rate 10 0 - 30 mm/hr   CRP inflammation   Result Value Ref Range    CRP Inflammation 9.6 (H) 0.0 - 8.0 mg/L   Vitamin D Level   Result Value Ref Range    Vitamin D Deficiency screening 28 20 - 75 ug/L   Hemoglobin A1c ([facility])   Result Value Ref Range    Hemoglobin A1C 5.5 4.1 - 5.7 %       Assessment and Plan     1. Multiple joint pain  2. Myalgia  3. Chronic fatigue  Dequan has presented to clinic on multiple occasions with the same complaints that have been ongoing chronically. Work up, including CMP, CBC, TSH, ESR, CRP, RF, CCP, have all been normal and these tests have been repeated on multiple occasions. No significant findings on physical exam. Dequan has environmental factors that may be contributing to presenting symptoms, for example, working 7 days a week. It is possible that she may have underlying osteoarthritis of the knees. Given her BMI, we discussed the importance of weight loss in off-loading pressure and stress on the knees. After discussion and shared decision making, it was decided to repeat labs today (most recent labs performed in Feb 2017). I anticipate a normal work up. It is likely that there is underlying somatoform disorder contributing to symptoms. Additionally, there is concern for anxiety and stress playing a role. Will plan to have patient return to clinic to discuss results and further explore stressors in life along with symptoms/signs of anxiety. It is likely that Dequan will require frequent clinic visits in setting of likely somatoform disorder.   - Comprehensive Metabolic Panel ([facility])  - Erythrocyte Sedimentation Rate ([facility])  - CRP inflammation  - Vitamin D Level  - CBC with Plt ([facility])  - TSH with free T4 reflex    4. Health care maintenance  Flu vaccine was offered, patient declined however agreed to  receive PCV13 and Tdap.   Due for DEXA scan, ordered and instructions given to patient on scheduling.  Also due for colon cancer screening and mammogram, will plan to discuss this again at follow up visit.   - Cholecalciferol (VITAMIN D) 2000 UNITS tablet; Take 2,000 Units by mouth daily  Dispense: 100 tablet; Refill: 3  - Hemoglobin A1c ([facility])  - DX Hip/Pelvis/Spine (DEXA); Future  - ADMIN VACCINE, INITIAL  - ADMIN VACCINE, EACH ADDITIONAL  - TDAP VACCINE (BOOSTRIX)  - Pneumococcal vaccine 13 valent PCV13 IM (Prevnar) [80203]    5. Blurry vision, right eye  6. Eye pain, right  Dequan was seen by ophthalmology on 9/21/2016 for an inferior visual field defect, with recommendation for follow up MRI to rule out posterior optic nerve and intracranial etiology. Patient did not have the MRI performed due to fear of being in an enclosed space. She was offered pre-medication to help her relax but declined and the MRI has not yet been done.   - polyvinyl alcohol (ARTIFICIAL TEARS) 1.4 % ophthalmic solution; Place 1 drop into both eyes as needed for dry eyes  Dispense: 15 mL; Refill: 3    7. Eye dryness  - polyvinyl alcohol (ARTIFICIAL TEARS) 1.4 % ophthalmic solution; Place 1 drop into both eyes as needed for dry eyes  Dispense: 15 mL; Refill: 3    There are no discontinued medications.  Options for treatment and follow-up care were reviewed with the patient. Dequan Capps  engaged in the decision making process and verbalized understanding of the options discussed and agreed with the final plan.    Donte Wasserman MD  Family Medicine PGY2

## 2017-09-26 NOTE — PATIENT INSTRUCTIONS
Here is the plan from today's visit    Body aches:  Today we will do some lab work to check for your body aches.  You are due for a DEXA scan which looks at how strong your bones are.      Benign essential hypertension  - Multiple Vitamin (DAILY ENOC) TABS; Take 1 tablet by mouth daily  Dispense: 30 tablet; Refill: 0     Health care maintenance  - Cholecalciferol (VITAMIN D) 2000 UNITS tablet; Take 2,000 Units by mouth daily  Dispense: 100 tablet; Refill: 3    Shots:  Today you received the Tdap and pneumonia shot    Please call or return to clinic if your symptoms don't go away.    Follow up plan as needed    Thank you for coming to [facility] today.  Lab Testing:  **If you had lab testing today and your results are reassuring or normal they will be mailed to you or sent through [employer] within 7 days.   **If the lab tests need quick action we will call you with the results.  The phone number we will call with results is # [phone] (home) none (work). If this is not the best number please call our clinic and change the number.  Medication Refills:  If you need any refills please call your pharmacy and they will contact us.   If you need to  your refill at a new pharmacy, please contact the new pharmacy directly. The new pharmacy will help you get your medications transferred faster.   Scheduling:  If you have any concerns about today's visit or wish to schedule another appointment please call our office during normal business hours [phone] (8-5:00 M-F)  If a referral was made to a [facility] and you don't get a call from central scheduling please call [phone].  If a Mammogram was ordered for you at [facility] call [phone] to schedule or change your appointment.  If you had an XRay/CT/Ultrasound/MRI ordered the number is [phone] to schedule or change your radiology appointment.   Medical Concerns:  If you have urgent medical concerns please call  [phone] at any time of the day.

## 2017-09-27 ENCOUNTER — TELEPHONE (OUTPATIENT)
Dept: FAMILY MEDICINE | Facility: CLINIC | Age: 67
End: 2017-09-27

## 2017-09-27 ASSESSMENT — ENCOUNTER SYMPTOMS
CONSTIPATION: 0
NUMBNESS: 0
CHILLS: 0
SHORTNESS OF BREATH: 0
RHINORRHEA: 0
NERVOUS/ANXIOUS: 1
COUGH: 0
DIZZINESS: 1
FATIGUE: 1
DIARRHEA: 0
SORE THROAT: 0
NAUSEA: 0
BACK PAIN: 1
ARTHRALGIAS: 1
FEVER: 0
UNEXPECTED WEIGHT CHANGE: 0
ABDOMINAL PAIN: 0
WEAKNESS: 0
JOINT SWELLING: 1
MYALGIAS: 1
VOMITING: 0
HEADACHES: 0

## 2017-09-27 NOTE — TELEPHONE ENCOUNTER
Called patient and LVM with message below from Dr. Wasserman.      Please call patient to have her follow up in clinic to discuss results and next steps in plan. She may follow up in 1-2 weeks with her PCP or myself depending on scheduling.       Donte Wasserman MD

## 2017-10-04 ENCOUNTER — OFFICE VISIT (OUTPATIENT)
Dept: FAMILY MEDICINE | Facility: CLINIC | Age: 67
End: 2017-10-04

## 2017-10-04 VITALS
RESPIRATION RATE: 18 BRPM | OXYGEN SATURATION: 98 % | DIASTOLIC BLOOD PRESSURE: 84 MMHG | TEMPERATURE: 97.6 F | SYSTOLIC BLOOD PRESSURE: 148 MMHG | WEIGHT: 176.4 LBS | HEART RATE: 78 BPM | BODY MASS INDEX: 35.63 KG/M2

## 2017-10-04 DIAGNOSIS — Z00.00 HEALTH CARE MAINTENANCE: ICD-10-CM

## 2017-10-04 DIAGNOSIS — K21.9 GASTROESOPHAGEAL REFLUX DISEASE WITHOUT ESOPHAGITIS: Primary | ICD-10-CM

## 2017-10-04 DIAGNOSIS — L03.113 CELLULITIS OF RIGHT UPPER EXTREMITY: ICD-10-CM

## 2017-10-04 RX ORDER — AMOXICILLIN 500 MG/1
500 CAPSULE ORAL 2 TIMES DAILY
Qty: 14 CAPSULE | Refills: 0 | Status: SHIPPED | OUTPATIENT
Start: 2017-10-04 | End: 2017-10-11

## 2017-10-04 RX ORDER — MULTIVITAMIN
1 TABLET ORAL DAILY
Qty: 90 TABLET | Refills: 3 | Status: SHIPPED | OUTPATIENT
Start: 2017-10-04

## 2017-10-04 ASSESSMENT — ENCOUNTER SYMPTOMS
COUGH: 0
CHILLS: 0
DIARRHEA: 0
RHINORRHEA: 0
HEMATURIA: 0
FATIGUE: 1
HEADACHES: 0
WHEEZING: 0
FEVER: 0
NERVOUS/ANXIOUS: 1
FREQUENCY: 0
DIZZINESS: 0
SHORTNESS OF BREATH: 0
VOMITING: 0
MYALGIAS: 1
NAUSEA: 0
ARTHRALGIAS: 1
PHOTOPHOBIA: 0
CONSTIPATION: 1
SORE THROAT: 0
BACK PAIN: 1

## 2017-10-04 NOTE — PROGRESS NOTES
HPI:       Dequan Capps is a 67 year old who presents for the following  Patient presents with:  Results: discuss results      Has had heartburn for several years. Had positive H.pylori 2 years ago per patient, was treated, but did not finish treatment. Would like to have this checked. She continues to have epigastric burning throughout the day which does improve somewhat with her protonix.     Has redness and swelling on right upper arm at injection site. Denies any fever or chills, nausea, vomiting, diarrhea.     She would like to discuss her results of her lab work that was completed at her previous visit.     She reports she takes her blood pressure medication. She has not picked up her Vitamins or eye drops, did not know they were at the pharmacy.       A Cellfire  was used for  this visit.       Problem, Medication and Allergy Lists were   reviewed and are current.     Patient Active Problem List    Diagnosis Date Noted     H. pylori infection 03/03/2016     Priority: Medium     Completed 8/10 days of triple therapy.        Chondromalacia of patella 04/30/2015     Priority: Medium     H/o abnormal appearance of cervix 09/04/2012     Priority: Medium     NIL Paps in 5/8/2007, and 8/20/2012.       Health Care Home 08/22/2012     Priority: Medium     Tier 1   DX V65.8 REPLACED WITH 27785 HEALTH CARE HOME (04/08/2013)       Chronic hepatitis B virus infection (H) 08/22/2012     Priority: Medium     Care plan: HBV quant, AFP, hepatic panel, RUQUS q 6 months.  Problem list name updated by automated process. Provider to review       Esophageal reflux 08/22/2012     Priority: Medium     Essential hypertension 08/22/2012     Priority: Medium     Problem list name updated by automated process. Provider to review       Headache 08/22/2012     Priority: Medium     Problem list name updated by automated process. Provider to review       Diaphragmatic hernia 08/22/2012     Priority: Medium     Problem list  name updated by automated process. Provider to review       Insomnia 08/22/2012     Priority: Medium     Problem list name updated by automated process. Provider to review       Postmenopausal bleeding 08/22/2012     Priority: Medium     Rotator cuff (capsule) sprain 08/22/2012     Priority: Medium     Vitamin D deficiency 08/22/2012     Priority: Medium     Problem list name updated by automated process. Provider to review     ,     Current Outpatient Prescriptions   Medication Sig Dispense Refill     Multiple Vitamin (DAILY ENOC) TABS Take 1 tablet by mouth daily 30 tablet 0     Cholecalciferol (VITAMIN D) 2000 UNITS tablet Take 2,000 Units by mouth daily 100 tablet 3     polyvinyl alcohol (ARTIFICIAL TEARS) 1.4 % ophthalmic solution Place 1 drop into both eyes as needed for dry eyes 15 mL 3     pantoprazole (PROTONIX) 40 MG EC tablet Take 1 tablet (40 mg) by mouth daily Take 30-60 minutes before a meal. 30 tablet 3     amLODIPine (NORVASC) 5 MG tablet Take 1 tablet (5 mg) by mouth daily 30 tablet 6     Cholecalciferol (VITAMIN D) 2000 UNITS CAPS Take 1 tablet by mouth daily 90 capsule 1     loratadine (CLARITIN) 10 MG tablet Take 1 tablet (10 mg) by mouth daily 30 tablet 1     fluticasone (FLONASE) 50 MCG/ACT spray Spray 1-2 sprays into both nostrils daily 3 Bottle 1     cyclobenzaprine (FLEXERIL) 5 MG tablet Take 1 tablet (5 mg) by mouth nightly as needed for muscle spasms 20 tablet 1     acetaminophen (TYLENOL) 500 MG tablet Take 1-2 tablets (500-1,000 mg) by mouth every 6 hours as needed 60 tablet 3     potassium chloride (KLOR-CON) 20 MEQ packet Take 20 mEq by mouth daily 30 packet 3     ibuprofen (ADVIL,MOTRIN) 600 MG tablet Take 1 tablet (600 mg) by mouth every 6 hours as needed for moderate pain 60 tablet 0   ,   No Known Allergies  Patient is an established patient of this clinic.         Review of Systems:   Review of Systems   Constitutional: Positive for fatigue. Negative for chills and fever.   HENT:  Negative for rhinorrhea and sore throat.    Eyes: Negative for photophobia.   Respiratory: Negative for cough, shortness of breath and wheezing.    Cardiovascular: Negative for chest pain and leg swelling.   Gastrointestinal: Positive for constipation. Negative for diarrhea, nausea and vomiting.   Genitourinary: Negative for frequency and hematuria.   Musculoskeletal: Positive for arthralgias (diffuse), back pain (chronic) and myalgias.   Skin: Positive for rash.        Redness at right UE injection site, cyst-like growth at left UE injection site.    Neurological: Negative for dizziness and headaches.   Psychiatric/Behavioral: The patient is nervous/anxious (chronic ).              Physical Exam:   Patient Vitals for the past 24 hrs:   BP Temp Temp src Pulse Resp SpO2 Weight   10/04/17 0915 148/84 97.6  F (36.4  C) Oral 78 18 98 % 176 lb 6.4 oz (80 kg)     Body mass index is 35.63 kg/(m^2).  Vitals were reviewed and were normal     Physical Exam   Constitutional: She is oriented to person, place, and time. She appears well-developed and well-nourished. No distress.   HENT:   Head: Normocephalic and atraumatic.   Mouth/Throat: Oropharynx is clear and moist. No oropharyngeal exudate.   Eyes: Conjunctivae are normal. No scleral icterus.   Neck: Normal range of motion. Neck supple. No thyromegaly present.   Cardiovascular: Normal rate and regular rhythm.    No murmur heard.  Pulmonary/Chest: Effort normal and breath sounds normal. No respiratory distress. She has no wheezes.   Abdominal: Soft. Bowel sounds are normal. There is no tenderness.   Musculoskeletal: She exhibits no edema.   Lymphadenopathy:     She has no cervical adenopathy.   Neurological: She is alert and oriented to person, place, and time. She exhibits normal muscle tone.   Skin: She is not diaphoretic.   Right upper lateral extremity has ~4cm circular area that is erythematous and warmer that the surrounding skin. Left UE at site of injection from  previous visit has mildly tender fluctuant marble size    Psychiatric: Her mood appears anxious (slightly).       Results:     No investigations this encounter.      Assessment and Plan     Dequan is a 66 yo woman with a history of multiple complaints of muscle and joint pain in the past with no concerning labs from previous visit. There is some concern of somatoform disorder. She is not complaining of significant joint or muscle pain today, but does have a history of it. Ddx should include fibromyalgia if she continues to have symptoms. She seems to be reassured by the normal lab results. Today she does some evidence of possible early cellulitis at the right UE injection site with normal vitals today and no associated symptoms. Will treat this today and advised patient that if the area does not improve in the next 2-3 days or if she starts to become febrile on the antibiotics she should return to the clinic to be evaluated.     1. Health care maintenance  - Multiple Vitamin (DAILY ENOC) TABS; Take 1 tablet by mouth daily  Dispense: 90 tablet; Refill: 3    2. Gastroesophageal reflux disease without esophagitis. She reports that she has had a diagnosis of h.pylori in the past that was not adequately treated and continues to have symptoms today. Discussed options to evaluate this at this time and she agreed to to complete the stool antigen test. Advised her to do this before taking her first dose of Amoxicillin if possible, but not to hold off on starting antibiotics if she can't get a sample ASAP.   - H Pylori antigen stool; Future     3. Cellulitis of right upper extremity  - amoxicillin (AMOXIL) 500 MG capsule; Take 1 capsule (500 mg) by mouth 2 times daily for 7 days  Dispense: 14 capsule; Refill: 0    Advised patient that she should have close follow up for her symptoms.     There are no discontinued medications.  Options for treatment and follow-up care were reviewed with the patient. Dequan Capps  engaged in the  decision making process and verbalized understanding of the options discussed and agreed with the final plan.    Marcy Xiao MD  [facility]  [phone]

## 2017-10-04 NOTE — PATIENT INSTRUCTIONS
Here is the plan from today's visit    1. Health care maintenance  - Multiple Vitamin (DAILY ENOC) TABS; Take 1 tablet by mouth daily  Dispense: 90 tablet; Refill: 3    2. Gastroesophageal reflux disease without esophagitis  - H Pylori antigen stool; Future    3. Cellulitis of right upper extremity  - amoxicillin (AMOXIL) 500 MG capsule; Take 1 capsule (500 mg) by mouth 2 times daily for 7 days  Dispense: 14 capsule; Refill: 0      Please call or return to clinic if your symptoms don't go away.    Follow up plan  Please make a clinic appointment for follow up with me (INDU LANDAVERDE) in 1  week for follow up.    Thank you for coming to [facility] today.  Lab Testing:  **If you had lab testing today and your results are reassuring or normal they will be mailed to you or sent through Flomio within 7 days.   **If the lab tests need quick action we will call you with the results.  The phone number we will call with results is # [phone] (home) none (work). If this is not the best number please call our clinic and change the number.  Medication Refills:  If you need any refills please call your pharmacy and they will contact us.   If you need to  your refill at a new pharmacy, please contact the new pharmacy directly. The new pharmacy will help you get your medications transferred faster.   Scheduling:  If you have any concerns about today's visit or wish to schedule another appointment please call our office during normal business hours [phone] (8-5:00 M-F)  If a referral was made to a [facility] and you don't get a call from central scheduling please call [phone].  If a Mammogram was ordered for you at [facility] call [phone] to schedule or change your appointment.  If you had an XRay/CT/Ultrasound/MRI ordered the number is [phone] to schedule or change your radiology appointment.   Medical Concerns:  If you have urgent medical concerns please  call [phone] at any time of the day.

## 2017-10-04 NOTE — MR AVS SNAPSHOT
After Visit Summary   10/4/2017    Dequan Capps    MRN: [id number]           Patient Information     Date Of Birth          1950        Visit Information        Provider Department      10/4/2017 9:20 AM Marcy Xiao MD [facility]        Today's Diagnoses     Health care maintenance    -  1    Gastroesophageal reflux disease without esophagitis        Cellulitis of right upper extremity          Care Instructions    Here is the plan from today's visit    1. Health care maintenance  - Multiple Vitamin (DAILY ENOC) TABS; Take 1 tablet by mouth daily  Dispense: 90 tablet; Refill: 3    2. Gastroesophageal reflux disease without esophagitis  - H Pylori antigen stool; Future    3. Cellulitis of right upper extremity  - amoxicillin (AMOXIL) 500 MG capsule; Take 1 capsule (500 mg) by mouth 2 times daily for 7 days  Dispense: 14 capsule; Refill: 0      Please call or return to clinic if your symptoms don't go away.    Follow up plan  Please make a clinic appointment for follow up with me (MARCY XIAO) in 1  week for follow up.    Thank you for coming to [facility] today.  Lab Testing:  **If you had lab testing today and your results are reassuring or normal they will be mailed to you or sent through Zerve within 7 days.   **If the lab tests need quick action we will call you with the results.  The phone number we will call with results is # [phone] (home) none (work). If this is not the best number please call our clinic and change the number.  Medication Refills:  If you need any refills please call your pharmacy and they will contact us.   If you need to  your refill at a new pharmacy, please contact the new pharmacy directly. The new pharmacy will help you get your medications transferred faster.   Scheduling:  If you have any concerns about today's visit or wish to schedule another appointment please call our office during normal business hours  [phone] (8-5:00 M-F)  If a referral was made to a [facility] and you don't get a call from central scheduling please call [phone].  If a Mammogram was ordered for you at [facility] call [phone] to schedule or change your appointment.  If you had an XRay/CT/Ultrasound/MRI ordered the number is [phone] to schedule or change your radiology appointment.   Medical Concerns:  If you have urgent medical concerns please call [phone] at any time of the day.            Follow-ups after your visit        Future tests that were ordered for you today     Open Future Orders        Priority Expected Expires Ordered    H Pylori antigen stool Routine  11/3/2017 10/4/2017            Who to contact     Please call your clinic at [phone] to:    Ask questions about your health    Make or cancel appointments    Discuss your medicines    Learn about your test results    Speak to your doctor   If you have compliments or concerns about an experience at your clinic, or if you wish to file a complaint, please contact [facility] Patient Relations at [phone] or email us at [email or web address]         Additional Information About Your Visit        FlipKeyhart Information     SportSettert is an electronic gateway that provides easy, online access to your medical records. With Salemarked, you can request a clinic appointment, read your test results, renew a prescription or communicate with your care team.     To sign up for SportSettert visit the website at [email or web address]   You will be asked to enter the access code listed below, as well as some personal information. Please follow the directions to create your username and password.     Your access code is: [email or web address]  Expires: 2017 10:17 AM     Your access code will  in 90 days. If you need help or a new code, please contact your [facility] or call  [phone] for assistance.        Care EveryWhere ID     This is your Care EveryWhere ID. This could be used by other organizations to access your [place] medical records  [id number]        Your Vitals Were     Pulse Temperature Respirations Pulse Oximetry Breastfeeding? BMI (Body Mass Index)    78 97.6  F (36.4  C) (Oral) 18 98% No 35.63 kg/m2       Blood Pressure from Last 3 Encounters:   10/04/17 148/84   09/26/17 139/85   02/27/17 144/87    Weight from Last 3 Encounters:   10/04/17 176 lb 6.4 oz (80 kg)   09/26/17 174 lb (78.9 kg)   02/27/17 174 lb (78.9 kg)                 Today's Medication Changes          These changes are accurate as of: 10/4/17  9:43 AM.  If you have any questions, ask your nurse or doctor.               Start taking these medicines.        Dose/Directions    amoxicillin 500 MG capsule   Commonly known as:  AMOXIL   Used for:  Cellulitis of right upper extremity   Started by:  Marcy Xiao MD        Dose:  500 mg   Take 1 capsule (500 mg) by mouth 2 times daily for 7 days   Quantity:  14 capsule   Refills:  0         These medicines have changed or have updated prescriptions.        Dose/Directions    vitamin D 2000 UNITS tablet   This may have changed:  Another medication with the same name was removed. Continue taking this medication, and follow the directions you see here.   Used for:  Health care maintenance   Changed by:  Donte Wasserman MD        Dose:  2000 Units   Take 2,000 Units by mouth daily   Quantity:  100 tablet   Refills:  3         Stop taking these medicines if you haven't already. Please contact your care team if you have questions.     fluticasone 50 MCG/ACT spray   Commonly known as:  FLONASE   Stopped by:  Marcy Xiao MD           ibuprofen 600 MG tablet   Commonly known as:  ADVIL/MOTRIN   Stopped by:  Marcy Xiao MD           loratadine 10 MG tablet   Commonly known as:  CLARITIN   Stopped by:  Marcy Xiao MD            potassium chloride 20 MEQ Packet   Commonly known as:  KLOR-CON   Stopped by:  Marcy Xiao MD                Where to get your medicines      [facility] [place] [facility] [address] [facility]  [address] [facility] [address]     Phone:  [phone]     amoxicillin 500 MG capsule    DAILY ENOC Tabs                Primary Care Provider Office Phone # Fax #    Sonia Danielle Zarate [phone]       [address]  [facility] [address]        Equal Access to Services     [facility]: Hadii aad ku hadasho Soomaali, waaxda luqadaha, qaybta kaalmada adeegyada, waxay idiin hayaan adeeg khararhoda caal . So [facility] [phone].    ATENCIÓN: Si habla español, tiene a lauren disposición servicios gratuitos de asistencia lingüística. [facility] [phone].    We comply with applicable federal civil rights laws and [place] laws. We do not discriminate on the basis of race, color, national origin, age, disability, sex, sexual orientation, or gender identity.            Thank you!     Thank you for choosing [facility]  for your care. Our goal is always to provide you with excellent care. Hearing back from our patients is one way we can continue to improve our services. Please take a few minutes to complete the written survey that you may receive in the mail after your visit with us. Thank you!             Your Updated Medication List - Protect others around you: Learn how to safely use, store and throw away your medicines at www.disposemymeds.org.          This list is accurate as of: 10/4/17  9:43 AM.  Always use your most recent med list.                   Brand Name Dispense Instructions for use Diagnosis    acetaminophen 500 MG tablet    TYLENOL    60 tablet    Take 1-2 tablets (500-1,000 mg) by mouth every 6 hours as needed    Headache(784.0)       amLODIPine 5 MG tablet    NORVASC    30 tablet    Take 1  tablet (5 mg) by mouth daily    Benign essential hypertension       amoxicillin 500 MG capsule    AMOXIL    14 capsule    Take 1 capsule (500 mg) by mouth 2 times daily for 7 days    Cellulitis of right upper extremity       cyclobenzaprine 5 MG tablet    FLEXERIL    20 tablet    Take 1 tablet (5 mg) by mouth nightly as needed for muscle spasms    Neck pain       DAILY ENOC Tabs     90 tablet    Take 1 tablet by mouth daily    Health care maintenance       pantoprazole 40 MG EC tablet    PROTONIX    30 tablet    Take 1 tablet (40 mg) by mouth daily Take 30-60 minutes before a meal.    Dyspepsia       polyvinyl alcohol 1.4 % ophthalmic solution    ARTIFICIAL TEARS    15 mL    Place 1 drop into both eyes as needed for dry eyes    Eye pain, right, Blurry vision, right eye, Eye dryness       vitamin D 2000 UNITS tablet     100 tablet    Take 2,000 Units by mouth daily    Health care maintenance

## 2017-10-06 NOTE — PROGRESS NOTES
Preceptor Attestation:   Patient seen and discussed with the resident. Assessment and plan reviewed with resident and agreed upon.   Supervising Physician:  [facility]

## 2017-12-05 DIAGNOSIS — I10 BENIGN ESSENTIAL HYPERTENSION: ICD-10-CM

## 2017-12-05 RX ORDER — AMLODIPINE BESYLATE 5 MG/1
5 TABLET ORAL DAILY
Qty: 30 TABLET | Refills: 3 | Status: SHIPPED | OUTPATIENT
Start: 2017-12-05

## 2017-12-05 NOTE — TELEPHONE ENCOUNTER
Request for medication refill:    Date of last visit at clinic: 10/04/2017    Please complete refill if appropriate and CLOSE ENCOUNTER.    Closing the encounter signifies the refill is complete.    If refill has been denied, please complete the smart phrase .smirefuse and route it to the [facility] RN TRIAGE pool to inform the patient and the pharmacy.    Heaven Maynard MA

## 2018-01-02 DIAGNOSIS — R10.13 DYSPEPSIA: ICD-10-CM

## 2018-01-02 RX ORDER — PANTOPRAZOLE SODIUM 40 MG/1
40 TABLET, DELAYED RELEASE ORAL DAILY
Qty: 30 TABLET | Refills: 3 | Status: SHIPPED | OUTPATIENT
Start: 2018-01-02

## 2018-01-02 NOTE — TELEPHONE ENCOUNTER
Request for medication refill:    Date of last visit at clinic: 10/04/2017    Please complete refill if appropriate and CLOSE ENCOUNTER.    Closing the encounter signifies the refill is complete.    If refill has been denied, please complete the smart phrase .smirefuse and route it to the [facility] RN TRIAGE pool to inform the patient and the pharmacy.    Gary Winchester, CMA

## 2020-11-16 NOTE — PROGRESS NOTES
"[facility]  Gynecology Visit    *History obtained with Picarro phone *     Reason for Visit: postmenopausal bleeding    HPI:    Dequan Capps is a 70 year old P9 here for postmenopausal bleeding that began on . She was seen in the ED on  for this. At that time reported a day of spotting and lower abdominal/pelvic pain and cramping. Pelvic exam in the ED revealed bright red blood in the vaginal vault by no cause of bleeding. US showed endometrial stripe of 4 mm and no other fibroids or endometrial polyps to explain her bleeding. She has continued to have some cramping and spotting, not passing any clots. Denies any trauma to the area, not currently sexually active. Was told in the ED that she needed to have a sample \"cut out\" and also that she should have a breast exam.     GYN History  Menses: postmenopausal for last 14 years   Sexually active: not currently sexually active    Pap Smears: 2012 NILM  History of abnormal paps: none     OBHx  P9,  x9  Five of her children have passed away     Past Medical Hx  Past Medical History:   Diagnosis Date     Gastro-oesophageal reflux disease      Hypertension        Past Surgical hx  Past Surgical History:   Procedure Laterality Date     CHOLECYSTECTOMY  2005     Medications    Current Outpatient Medications:      acetaminophen (TYLENOL) 500 MG tablet, Take 1-2 tablets (500-1,000 mg) by mouth every 6 hours as needed, Disp: 60 tablet, Rfl: 3     amLODIPine (NORVASC) 5 MG tablet, Take 1 tablet (5 mg) by mouth daily, Disp: 30 tablet, Rfl: 3     Cholecalciferol (VITAMIN D) 2000 UNITS tablet, Take 2,000 Units by mouth daily, Disp: 100 tablet, Rfl: 3     cyclobenzaprine (FLEXERIL) 5 MG tablet, Take 1 tablet (5 mg) by mouth nightly as needed for muscle spasms, Disp: 20 tablet, Rfl: 1     Multiple Vitamin (DAILY ENOC) TABS, Take 1 tablet by mouth daily, Disp: 90 tablet, Rfl: 3     pantoprazole (PROTONIX) 40 MG EC tablet, Take 1 tablet (40 mg) by " "mouth daily Take 30-60 minutes before a meal., Disp: 30 tablet, Rfl: 3     polyvinyl alcohol (ARTIFICIAL TEARS) 1.4 % ophthalmic solution, Place 1 drop into both eyes as needed for dry eyes, Disp: 15 mL, Rfl: 3    Allergies  No Known Allergies    Family Hx  Sister with breast cancer in her 50s, passed away from this. No other known family hx of uterine or ovarian cancer.     Social Hx  Social History     Socioeconomic History     Marital status: Single     Spouse name: Not on file     Number of children: Not on file     Years of education: Not on file     Highest education level: Not on file   Occupational History     Not on file   Social Needs     Financial resource strain: Not on file     Food insecurity     Worry: Not on file     Inability: Not on file     Transportation needs     Medical: Not on file     Non-medical: Not on file   Tobacco Use     Smoking status: Never Smoker     Smokeless tobacco: Never Used   Substance and Sexual Activity     Alcohol use: No     Drug use: No     Sexual activity: Not on file   Lifestyle     Physical activity     Days per week: Not on file     Minutes per session: Not on file     Stress: Not on file   Relationships     Social connections     Talks on phone: Not on file     Gets together: Not on file     Attends Buddhist service: Not on file     Active member of club or organization: Not on file     Attends meetings of clubs or organizations: Not on file     Relationship status: Not on file     Intimate partner violence     Fear of current or ex partner: Not on file     Emotionally abused: Not on file     Physically abused: Not on file     Forced sexual activity: Not on file   Other Topics Concern     Not on file   Social History Narrative     Not on file       ROS: 10-Point ROS negative except as noted in HPI    Physical Exam  BP (!) 151/81   Pulse 90   Ht 1.499 m (4' 11\")   Wt 78.7 kg (173 lb 8 oz)   BMI 35.04 kg/m    Gen: Well-appearing, NAD  CV:  Warm and well perfused "   Pulm: Breathing comfortably on room air  Abd: Soft, non-tender, non-distended  Ext: No LE edema, extremities warm and well perfused    Breast: Symmetric, no nipple discharge or retraction, no axillary lymphadenopathy, no palpable nodules or masses bilaterally    Pelvic:  External genitalia notable for type 2 infibulation with absent labia minora. Normal hair distribution. Vagina is without lesions. Trace physiologic discharge. Cervix parous, the anterior lip has a 3x2 cm pedunculated mass that is firm and mobile, consistent with possible cervical fibroid vs enlarged nabothian cyst. Scant old blood at the cervical os. Able to pass EMB pipelle through cervical canal for sampling (see procedure note below). Pap smear also obtained given large cervical lesion.     Labs and Imaging  11/13/20 - Hgb 13.9      Plt 311     Pelvic US 11/16/2020  FINDINGS:    Uterus: Measures 7.7 x 3.1 x 4.0 cm. No focal uterine mass identified.  Endometrium: Measures up to 0.4 cm in thickness. No visualized hypervascularity.  Right Ovary: Not visualized.  Left Ovary: Measures 3.6 x 1.7 x 2.5 cm and appears unremarkable  Left Ovary Blood Flow: Present.  Free Fluid: no significant free fluid.    IMPRESSION: No finding to explain the patient's vaginal bleeding. Endometrial stripe measures 4 mm in thickness without visualized hypervascularity.    Assessment/Plan:  Dequan Capps is a 70 year old P9 female here for postmenopausal bleeding for the past 4 days with associated cramping. Was seen in the ED for these symptoms and had pelvic US with normal endometrial stripe. Exam remarkable for a large, 3x2 cm mass on the anterior cervical lip. This mass was firm and mobile, with otherwise normal appearing cervical tissue, most consistent with cervical fibroid vs enlarged nabothian cyst. EMB was obtained for endometrial sampling given recent postmenopausal bleeding. Pap smear also obtained to ensure no evidence of cervical dysplasia, though less likely  given appearance of lesion and normal pap smear in 2012.     # Postmenopausal bleeding  # Cervical lesion  - EMB obtained today, will call with results  - Pap smear obtained today, will call with results     # Routine health maintenance  - Due for mammogram, ordered today, breast exam normal  - HTN and other health screening labs through PCP      Return to clinic prn results from EMB and pap smear.      Endometrial Biopsy Procedure    Indication: Postmenopausal bleeding  Faculty:  I was present with the resident throughout the entire procedure.    Consent: Risks, benefits of treatment, and no treatment were discussed.  Patient's questions were elicited and answered.  and Written consent signed and scanned into medical record.    Using a medium Graves speculum, the cervix was visualized. The endometrial pipelle was advanced through the cervix without difficulty and a sample collected. One additional pass was made.      EBL: <5 mL    Complications:  None apparent  Pathology: EMB sample was sent to pathology.  Tolerance of Procedure:  Patient did tolerate the procedure well.     Patient was instructed to call if she experiences any heavy bleeding, severe cramping, or abnormal vaginal discharge.  May take ibuprofen 400-800 mg PO TID PRN or naproxen 500 mg PO BID for cramping.    Will notify patient of results.    Patient was seen and staffed with Dr. Corral.     Kathie Bal MD  OB/GYN Resident PGY-1  5:53 PM November 17, 2020      I saw this patient with the resident and agree with the resident s findings and plan of care as documented in the resident s note.  I personally reviewed her history and imaging.  I was present for the physical exam as well as endometrial biopsy procedure as described above.    Kylah Corral MD

## 2020-11-17 ENCOUNTER — OFFICE VISIT (OUTPATIENT)
Dept: OBGYN | Facility: CLINIC | Age: 70
End: 2020-11-17
Payer: MEDICARE

## 2020-11-17 VITALS
HEART RATE: 90 BPM | DIASTOLIC BLOOD PRESSURE: 81 MMHG | HEIGHT: 59 IN | WEIGHT: 173.5 LBS | SYSTOLIC BLOOD PRESSURE: 151 MMHG | BODY MASS INDEX: 34.98 KG/M2

## 2020-11-17 DIAGNOSIS — Z12.39 ENCOUNTER FOR SCREENING FOR MALIGNANT NEOPLASM OF BREAST, UNSPECIFIED SCREENING MODALITY: ICD-10-CM

## 2020-11-17 DIAGNOSIS — N95.0 POSTMENOPAUSAL BLEEDING: Primary | ICD-10-CM

## 2020-11-17 DIAGNOSIS — Z12.31 ENCOUNTER FOR SCREENING MAMMOGRAM FOR MALIGNANT NEOPLASM OF BREAST: ICD-10-CM

## 2020-11-17 PROCEDURE — 58100 BIOPSY OF UTERUS LINING: CPT | Mod: GC | Performed by: OBSTETRICS & GYNECOLOGY

## 2020-11-17 PROCEDURE — 88305 TISSUE EXAM BY PATHOLOGIST: CPT | Mod: 26 | Performed by: PATHOLOGY

## 2020-11-17 PROCEDURE — 99203 OFFICE O/P NEW LOW 30 MIN: CPT | Mod: 25 | Performed by: OBSTETRICS & GYNECOLOGY

## 2020-11-17 PROCEDURE — 58100 BIOPSY OF UTERUS LINING: CPT | Performed by: STUDENT IN AN ORGANIZED HEALTH CARE EDUCATION/TRAINING PROGRAM

## 2020-11-17 PROCEDURE — G0124 SCREEN C/V THIN LAYER BY MD: HCPCS | Performed by: PATHOLOGY

## 2020-11-17 PROCEDURE — 88305 TISSUE EXAM BY PATHOLOGIST: CPT | Mod: TC | Performed by: STUDENT IN AN ORGANIZED HEALTH CARE EDUCATION/TRAINING PROGRAM

## 2020-11-17 PROCEDURE — 87624 HPV HI-RISK TYP POOLED RSLT: CPT | Performed by: OBSTETRICS & GYNECOLOGY

## 2020-11-17 PROCEDURE — 88175 CYTOPATH C/V AUTO FLUID REDO: CPT | Mod: TC | Performed by: OBSTETRICS & GYNECOLOGY

## 2020-11-17 ASSESSMENT — MIFFLIN-ST. JEOR: SCORE: 1222.62

## 2020-11-19 LAB — COPATH REPORT: NORMAL

## 2020-11-20 LAB
COPATH REPORT: NORMAL
PAP: NORMAL

## 2020-11-23 LAB
FINAL DIAGNOSIS: NORMAL
HPV HR 12 DNA CVX QL NAA+PROBE: NEGATIVE
HPV16 DNA SPEC QL NAA+PROBE: NEGATIVE
HPV18 DNA SPEC QL NAA+PROBE: NEGATIVE
SPECIMEN DESCRIPTION: NORMAL
SPECIMEN SOURCE CVX/VAG CYTO: NORMAL

## 2023-07-31 ENCOUNTER — ANCILLARY ORDERS (OUTPATIENT)
Dept: CARDIOLOGY | Facility: CLINIC | Age: 73
End: 2023-07-31

## 2023-07-31 DIAGNOSIS — I10 ESSENTIAL HYPERTENSION: Primary | ICD-10-CM

## 2024-01-01 ENCOUNTER — APPOINTMENT (OUTPATIENT)
Dept: CT IMAGING | Facility: CLINIC | Age: 74
End: 2024-01-01
Attending: STUDENT IN AN ORGANIZED HEALTH CARE EDUCATION/TRAINING PROGRAM
Payer: COMMERCIAL

## 2024-01-01 ENCOUNTER — HOSPITAL ENCOUNTER (EMERGENCY)
Facility: CLINIC | Age: 74
Discharge: HOME | End: 2024-01-01
Attending: STUDENT IN AN ORGANIZED HEALTH CARE EDUCATION/TRAINING PROGRAM | Admitting: STUDENT IN AN ORGANIZED HEALTH CARE EDUCATION/TRAINING PROGRAM
Payer: COMMERCIAL

## 2024-01-01 ENCOUNTER — APPOINTMENT (OUTPATIENT)
Dept: GENERAL RADIOLOGY | Facility: CLINIC | Age: 74
End: 2024-01-01
Attending: STUDENT IN AN ORGANIZED HEALTH CARE EDUCATION/TRAINING PROGRAM
Payer: COMMERCIAL

## 2024-01-01 VITALS
TEMPERATURE: 98.7 F | RESPIRATION RATE: 19 BRPM | DIASTOLIC BLOOD PRESSURE: 75 MMHG | OXYGEN SATURATION: 99 % | HEART RATE: 91 BPM | SYSTOLIC BLOOD PRESSURE: 156 MMHG

## 2024-01-01 DIAGNOSIS — K44.9 HIATAL HERNIA: ICD-10-CM

## 2024-01-01 DIAGNOSIS — B34.9 VIRAL SYNDROME: ICD-10-CM

## 2024-01-01 LAB
ALBUMIN SERPL BCG-MCNC: 4 G/DL (ref 3.5–5.2)
ALBUMIN UR-MCNC: NEGATIVE MG/DL
ALP SERPL-CCNC: 74 U/L (ref 40–150)
ALT SERPL W P-5'-P-CCNC: 16 U/L (ref 0–50)
ANION GAP SERPL CALCULATED.3IONS-SCNC: 9 MMOL/L (ref 7–15)
APPEARANCE UR: CLEAR
AST SERPL W P-5'-P-CCNC: 18 U/L (ref 0–45)
ATRIAL RATE - MUSE: 94 BPM
BASOPHILS # BLD AUTO: 0 10E3/UL (ref 0–0.2)
BASOPHILS NFR BLD AUTO: 0 %
BILIRUB SERPL-MCNC: <0.2 MG/DL
BILIRUB UR QL STRIP: NEGATIVE
BUN SERPL-MCNC: 12.7 MG/DL (ref 8–23)
CALCIUM SERPL-MCNC: 9.4 MG/DL (ref 8.8–10.2)
CHLORIDE SERPL-SCNC: 102 MMOL/L (ref 98–107)
COLOR UR AUTO: ABNORMAL
CREAT SERPL-MCNC: 0.74 MG/DL (ref 0.51–0.95)
DEPRECATED HCO3 PLAS-SCNC: 27 MMOL/L (ref 22–29)
DIASTOLIC BLOOD PRESSURE - MUSE: NORMAL MMHG
EGFRCR SERPLBLD CKD-EPI 2021: 85 ML/MIN/1.73M2
EOSINOPHIL # BLD AUTO: 0 10E3/UL (ref 0–0.7)
EOSINOPHIL NFR BLD AUTO: 0 %
ERYTHROCYTE [DISTWIDTH] IN BLOOD BY AUTOMATED COUNT: 15 % (ref 10–15)
FLUAV RNA SPEC QL NAA+PROBE: NEGATIVE
FLUBV RNA RESP QL NAA+PROBE: NEGATIVE
GLUCOSE SERPL-MCNC: 121 MG/DL (ref 70–99)
GLUCOSE UR STRIP-MCNC: NEGATIVE MG/DL
HCT VFR BLD AUTO: 37.1 % (ref 35–47)
HGB BLD-MCNC: 12.1 G/DL (ref 11.7–15.7)
HGB UR QL STRIP: NEGATIVE
IMM GRANULOCYTES # BLD: 0.2 10E3/UL
IMM GRANULOCYTES NFR BLD: 2 %
INTERPRETATION ECG - MUSE: NORMAL
KETONES UR STRIP-MCNC: NEGATIVE MG/DL
LACTATE SERPL-SCNC: 1.7 MMOL/L (ref 0.7–2)
LEUKOCYTE ESTERASE UR QL STRIP: NEGATIVE
LIPASE SERPL-CCNC: 20 U/L (ref 13–60)
LYMPHOCYTES # BLD AUTO: 1 10E3/UL (ref 0.8–5.3)
LYMPHOCYTES NFR BLD AUTO: 8 %
MCH RBC QN AUTO: 26.4 PG (ref 26.5–33)
MCHC RBC AUTO-ENTMCNC: 32.6 G/DL (ref 31.5–36.5)
MCV RBC AUTO: 81 FL (ref 78–100)
MONOCYTES # BLD AUTO: 0.4 10E3/UL (ref 0–1.3)
MONOCYTES NFR BLD AUTO: 3 %
NEUTROPHILS # BLD AUTO: 10.2 10E3/UL (ref 1.6–8.3)
NEUTROPHILS NFR BLD AUTO: 87 %
NITRATE UR QL: NEGATIVE
NRBC # BLD AUTO: 0 10E3/UL
NRBC BLD AUTO-RTO: 0 /100
NT-PROBNP SERPL-MCNC: 187 PG/ML (ref 0–900)
P AXIS - MUSE: 37 DEGREES
PH UR STRIP: 8 [PH] (ref 5–7)
PLATELET # BLD AUTO: 401 10E3/UL (ref 150–450)
POTASSIUM SERPL-SCNC: 4 MMOL/L (ref 3.4–5.3)
PR INTERVAL - MUSE: 154 MS
PROT SERPL-MCNC: 8 G/DL (ref 6.4–8.3)
QRS DURATION - MUSE: 78 MS
QT - MUSE: 350 MS
QTC - MUSE: 437 MS
R AXIS - MUSE: 18 DEGREES
RBC # BLD AUTO: 4.59 10E6/UL (ref 3.8–5.2)
RBC URINE: 0 /HPF
RSV RNA SPEC NAA+PROBE: NEGATIVE
SARS-COV-2 RNA RESP QL NAA+PROBE: NEGATIVE
SODIUM SERPL-SCNC: 138 MMOL/L (ref 135–145)
SP GR UR STRIP: >1.05 (ref 1–1.03)
SQUAMOUS EPITHELIAL: 2 /HPF
SYSTOLIC BLOOD PRESSURE - MUSE: NORMAL MMHG
T AXIS - MUSE: 30 DEGREES
TRANSITIONAL EPI: <1 /HPF
TROPONIN T SERPL HS-MCNC: <6 NG/L
UROBILINOGEN UR STRIP-MCNC: NORMAL MG/DL
VENTRICULAR RATE- MUSE: 94 BPM
WBC # BLD AUTO: 11.8 10E3/UL (ref 4–11)
WBC URINE: <1 /HPF

## 2024-01-01 PROCEDURE — 74174 CTA ABD&PLVS W/CONTRAST: CPT | Mod: 26 | Performed by: RADIOLOGY

## 2024-01-01 PROCEDURE — 94640 AIRWAY INHALATION TREATMENT: CPT | Performed by: STUDENT IN AN ORGANIZED HEALTH CARE EDUCATION/TRAINING PROGRAM

## 2024-01-01 PROCEDURE — 80053 COMPREHEN METABOLIC PANEL: CPT | Performed by: STUDENT IN AN ORGANIZED HEALTH CARE EDUCATION/TRAINING PROGRAM

## 2024-01-01 PROCEDURE — 85025 COMPLETE CBC W/AUTO DIFF WBC: CPT | Performed by: STUDENT IN AN ORGANIZED HEALTH CARE EDUCATION/TRAINING PROGRAM

## 2024-01-01 PROCEDURE — 87637 SARSCOV2&INF A&B&RSV AMP PRB: CPT | Mod: 59 | Performed by: STUDENT IN AN ORGANIZED HEALTH CARE EDUCATION/TRAINING PROGRAM

## 2024-01-01 PROCEDURE — 93010 ELECTROCARDIOGRAM REPORT: CPT | Performed by: STUDENT IN AN ORGANIZED HEALTH CARE EDUCATION/TRAINING PROGRAM

## 2024-01-01 PROCEDURE — 84484 ASSAY OF TROPONIN QUANT: CPT | Performed by: STUDENT IN AN ORGANIZED HEALTH CARE EDUCATION/TRAINING PROGRAM

## 2024-01-01 PROCEDURE — 99285 EMERGENCY DEPT VISIT HI MDM: CPT | Mod: 25 | Performed by: STUDENT IN AN ORGANIZED HEALTH CARE EDUCATION/TRAINING PROGRAM

## 2024-01-01 PROCEDURE — 36415 COLL VENOUS BLD VENIPUNCTURE: CPT | Performed by: STUDENT IN AN ORGANIZED HEALTH CARE EDUCATION/TRAINING PROGRAM

## 2024-01-01 PROCEDURE — 83605 ASSAY OF LACTIC ACID: CPT | Performed by: STUDENT IN AN ORGANIZED HEALTH CARE EDUCATION/TRAINING PROGRAM

## 2024-01-01 PROCEDURE — 71275 CT ANGIOGRAPHY CHEST: CPT | Mod: 26 | Performed by: RADIOLOGY

## 2024-01-01 PROCEDURE — 74174 CTA ABD&PLVS W/CONTRAST: CPT

## 2024-01-01 PROCEDURE — 71046 X-RAY EXAM CHEST 2 VIEWS: CPT

## 2024-01-01 PROCEDURE — 250N000009 HC RX 250: Performed by: STUDENT IN AN ORGANIZED HEALTH CARE EDUCATION/TRAINING PROGRAM

## 2024-01-01 PROCEDURE — 83880 ASSAY OF NATRIURETIC PEPTIDE: CPT | Performed by: STUDENT IN AN ORGANIZED HEALTH CARE EDUCATION/TRAINING PROGRAM

## 2024-01-01 PROCEDURE — 250N000011 HC RX IP 250 OP 636: Performed by: STUDENT IN AN ORGANIZED HEALTH CARE EDUCATION/TRAINING PROGRAM

## 2024-01-01 PROCEDURE — 83690 ASSAY OF LIPASE: CPT | Performed by: STUDENT IN AN ORGANIZED HEALTH CARE EDUCATION/TRAINING PROGRAM

## 2024-01-01 PROCEDURE — 81001 URINALYSIS AUTO W/SCOPE: CPT | Performed by: STUDENT IN AN ORGANIZED HEALTH CARE EDUCATION/TRAINING PROGRAM

## 2024-01-01 PROCEDURE — 93005 ELECTROCARDIOGRAM TRACING: CPT | Performed by: STUDENT IN AN ORGANIZED HEALTH CARE EDUCATION/TRAINING PROGRAM

## 2024-01-01 RX ORDER — IOPAMIDOL 755 MG/ML
100 INJECTION, SOLUTION INTRAVASCULAR ONCE
Status: COMPLETED | OUTPATIENT
Start: 2024-01-01 | End: 2024-01-01

## 2024-01-01 RX ORDER — IPRATROPIUM BROMIDE AND ALBUTEROL SULFATE 2.5; .5 MG/3ML; MG/3ML
3 SOLUTION RESPIRATORY (INHALATION) ONCE
Status: COMPLETED | OUTPATIENT
Start: 2024-01-01 | End: 2024-01-01

## 2024-01-01 RX ADMIN — IOPAMIDOL 72 ML: 755 INJECTION, SOLUTION INTRAVENOUS at 20:31

## 2024-01-01 RX ADMIN — SODIUM CHLORIDE 65 ML: 9 INJECTION, SOLUTION INTRAVENOUS at 20:31

## 2024-01-01 RX ADMIN — IPRATROPIUM BROMIDE AND ALBUTEROL SULFATE 3 ML: .5; 3 SOLUTION RESPIRATORY (INHALATION) at 23:25

## 2024-01-01 ASSESSMENT — ACTIVITIES OF DAILY LIVING (ADL)
ADLS_ACUITY_SCORE: 33
ADLS_ACUITY_SCORE: 35
ADLS_ACUITY_SCORE: 35

## 2024-01-02 NOTE — ED PROVIDER NOTES
"    [facility] EMERGENCY DEPARTMENT ([facility])    1/01/24      ED PROVIDER NOTE      History     Chief Complaint   Patient presents with    Chest Pain     Onset 8 days ago with the cough, mid-sternum and pressure in shoulders; seen last Friday at [facility] ED, prescribed meds and did xrays; now today pt is feeling the chest pressure and shortness of breath is getting worse    Abdominal Pain     \"I am having pain in my liver\", pointing to right sided area abdominal pain     HPI  Dequan Capps is a 74 year old female with a history of HTN, HLD, GERD, H. Pylori, hiatal hernia presents to the ED for evaluation of chest pain.   She was seen at an outside ED 12/29/23 for dyspnea and diagnosed with bronchospasm and prescribed prednisone and Duonebs.  Her covid and influenza swab was negative at that visit.    Presents with worsening difficulty breathing, cough that is nonproductive, as well as some aches in her chest as well as her upper abdomen that radiated into her back.  Notes that these are worse anytime she takes a big breath or she coughs.  Has been having no nausea, vomiting, diarrhea, constipation.  No blood in her stools.  Does feel little short of breath when up and moving around and was worried that her pain she was having in her chest may be from her heart.  Denies any significant lower extremity edema, no recent travel, no history of any blood clots, no hemoptysis      2 View CXR 12/29/23:  FINDINGS:  There is a soft tissue density behind the heart slightly larger than before likely related to an esophageal hiatal hernia. Please see upper GI September 22, 2000. No pneumothorax. No evidence for active congestive heart failure or pneumonia. Overall heart size is normal. Degenerative change of the AC joints. Surgical clips in the right upper quadrant.    IMPRESSION :  Moderate-sized esophageal hiatal hernia behind the heart.    No acute cardiopulmonary process identified.      Past Medical History  Past Medical " History:   Diagnosis Date    Gastro-oesophageal reflux disease     Hypertension      Past Surgical History:   Procedure Laterality Date    CHOLECYSTECTOMY  08/04/2005     acetaminophen (TYLENOL) 500 MG tablet  amLODIPine (NORVASC) 5 MG tablet  Cholecalciferol (VITAMIN D) 2000 UNITS tablet  cyclobenzaprine (FLEXERIL) 5 MG tablet  Multiple Vitamin (DAILY ENOC) TABS  pantoprazole (PROTONIX) 40 MG EC tablet  polyvinyl alcohol (ARTIFICIAL TEARS) 1.4 % ophthalmic solution      No Known Allergies  Family History  Family History   Problem Relation Age of Onset    Family History Negative Other      Social History   Social History     Tobacco Use    Smoking status: Never    Smokeless tobacco: Never   Substance Use Topics    Alcohol use: No    Drug use: No         A medically appropriate review of systems was performed with pertinent positives and negatives noted in the HPI, and all other systems negative.    Physical Exam   BP: 139/77  Pulse: 100  Temp: 98.7  F (37.1  C)  Resp: 18  SpO2: 97 %  Physical Exam  GEN: Well appearing, non toxic, cooperative  HEENT: normocephalic and atraumatic, PERRLA, EOMI  CV: well-perfused, normal skin color for ethnicity, sinus tachycardia, no murmurs rubs or gallops  PULM: breathing comfortably, in no respiratory distress, coarse breath sounds in the left lower lung field  ABD: nondistended, soft, epigastric tenderness to palpation, negative Luther, negative Lamont point tenderness  EXT: Full range of motion.  No edema.  NEURO: awake, conversant, grossly normal bilateral upper and lower extremity strength & ROM   SKIN: No rashes, ecchymosis, or lacerations  PSYCH: Calm and cooperative, interactive   ED Course, Procedures, & Data      Procedures            EKG Interpretation:      Interpreted by Adri Groves MD  Time reviewed: 1828   Symptoms at time of EKG: chest/abdominal pain    Rhythm: normal sinus   Rate: normal  Axis: normal  Ectopy: none  Conduction: normal  ST Segments/ T Waves: No ST-T  wave changes  Q Waves: none  Comparison to prior: Unchanged    Clinical Impression: normal EKG      Results for orders placed or performed during the hospital encounter of 01/01/24   Lactic acid whole blood     Status: Normal   Result Value Ref Range    Lactic Acid 1.7 0.7 - 2.0 mmol/L   CBC with platelets and differential     Status: Abnormal   Result Value Ref Range    WBC Count 11.8 (H) 4.0 - 11.0 10e3/uL    RBC Count 4.59 3.80 - 5.20 10e6/uL    Hemoglobin 12.1 11.7 - 15.7 g/dL    Hematocrit 37.1 35.0 - 47.0 %    MCV 81 78 - 100 fL    MCH 26.4 (L) 26.5 - 33.0 pg    MCHC 32.6 31.5 - 36.5 g/dL    RDW 15.0 10.0 - 15.0 %    Platelet Count 401 150 - 450 10e3/uL    % Neutrophils 87 %    % Lymphocytes 8 %    % Monocytes 3 %    % Eosinophils 0 %    % Basophils 0 %    % Immature Granulocytes 2 %    NRBCs per 100 WBC 0 <1 /100    Absolute Neutrophils 10.2 (H) 1.6 - 8.3 10e3/uL    Absolute Lymphocytes 1.0 0.8 - 5.3 10e3/uL    Absolute Monocytes 0.4 0.0 - 1.3 10e3/uL    Absolute Eosinophils 0.0 0.0 - 0.7 10e3/uL    Absolute Basophils 0.0 0.0 - 0.2 10e3/uL    Absolute Immature Granulocytes 0.2 <=0.4 10e3/uL    Absolute NRBCs 0.0 10e3/uL   EKG 12-lead, tracing only     Status: None (Preliminary result)   Result Value Ref Range    Systolic Blood Pressure  mmHg    Diastolic Blood Pressure  mmHg    Ventricular Rate 94 BPM    Atrial Rate 94 BPM    WV Interval 154 ms    QRS Duration 78 ms     ms    QTc 437 ms    P Axis 37 degrees    R AXIS 18 degrees    T Axis 30 degrees    Interpretation ECG Sinus rhythm  Normal ECG      CBC with platelets differential     Status: Abnormal    Narrative    The following orders were created for panel order CBC with platelets differential.  Procedure                               Abnormality         Status                     ---------                               -----------         ------                     CBC with platelets and d...[[id number]]  Abnormal            Final result                  Please view results for these tests on the individual orders.     Medications - No data to display  Labs Ordered and Resulted from Time of ED Arrival to Time of ED Departure   CBC WITH PLATELETS AND DIFFERENTIAL - Abnormal       Result Value    WBC Count 11.8 (*)     RBC Count 4.59      Hemoglobin 12.1      Hematocrit 37.1      MCV 81      MCH 26.4 (*)     MCHC 32.6      RDW 15.0      Platelet Count 401      % Neutrophils 87      % Lymphocytes 8      % Monocytes 3      % Eosinophils 0      % Basophils 0      % Immature Granulocytes 2      NRBCs per 100 WBC 0      Absolute Neutrophils 10.2 (*)     Absolute Lymphocytes 1.0      Absolute Monocytes 0.4      Absolute Eosinophils 0.0      Absolute Basophils 0.0      Absolute Immature Granulocytes 0.2      Absolute NRBCs 0.0     LACTIC ACID WHOLE BLOOD - Normal    Lactic Acid 1.7     COMPREHENSIVE METABOLIC PANEL   LIPASE   TROPONIN T, HIGH SENSITIVITY   ROUTINE UA WITH MICROSCOPIC REFLEX TO CULTURE   NT PROBNP INPATIENT   INFLUENZA A/B, RSV, & SARS-COV2 PCR     XR Chest 2 Views    (Results Pending)   CTA Chest Abdomen Pelvis w Contrast    (Results Pending)          Critical care was not performed.     Medical Decision Making  The patient's presentation was of high complexity (an acute health issue posing potential threat to life or bodily function).    The patient's evaluation involved:  an assessment requiring an independent historian (see separate area of note for details)  review of external note(s) from 3+ sources (see separate area of note for details)  review of 3+ test result(s) ordered prior to this encounter (see separate area of note for details)  ordering and/or review of 3+ test(s) in this encounter (see separate area of note for details)    The patient's management necessitated high risk (a decision regarding hospitalization).    Assessment & Plan    74-year-old female with a past medical history of hypertension, hyperlipidemia presenting to emergency  department due to chest pain, shortness of breath, as well as pain that now radiates from the center of her chest to the back noted to be hemodynamically stable although with sinus tachycardia and some coarse breath sounds in her left lower lung fields as well as epigastric abdominal tenderness to palpation.    Differential in this patient is broad and includes pneumonia, viral process, COVID, RSV, flu, asthma, bronchitis, ACS, aortic dissection, incarcerated esophageal hernia, less likely pulmonary embolism.    All in all seems most consistent with either a viral process or postviral pneumonia.  Will plan for x-ray, however due to her significant pain that she is having radiating from the center of her chest into her back, I do have further concerns including cardiac, as well as aortic pathology.  Will plan for CT angiogram to evaluate for her aortic pathology, and with regards to the ACS concern, will hold on aspirin pending the CT imaging.  EKG on arrival is reassuring without any evidence of ischemia.  Considered CHF as well, however no significant lower extremity edema, unilateral crackles heard in the left lower lung base, no other evidence of any fluid overload.  Did consider PE, however this is less likely than aortic dissection, however believe that the CT angiogram would show any significant clinically concerning PEs that would be causing her symptoms.  No significant wheeze with lower suspicion of asthma/COPD    11:18 PM workup otherwise is relatively unremarkable.  CT angiogram demonstrates large hiatal hernia, but no other significant acute causes of her symptoms.  Now that she is resting, there is a bit more expiratory wheeze throughout.  Notes that she has been trying to use the albuterol inhaler at home, but has been had a difficult time understanding how she supposed to use it.  We discussed how she is supposed to use it, and will also give her a DuoNeb here.  Possible there is some kind of mild  COPD component to it, but she is already on steroids at this point.  Also discussed the possibility that some of this is due to physical limitations due to the size of her hiatal hernia.  Will plan for a referral for general surgery regarding this.    I have reviewed the nursing notes. I have reviewed the findings, diagnosis, plan and need for follow up with the patient.    New Prescriptions    No medications on file       Final diagnoses:   Viral syndrome   Hiatal hernia       Adri Groves MD   [facility] EMERGENCY DEPARTMENT  1/1/2024        Adri Groves MD  01/01/24 [id number]       Adri Groves MD  01/01/24 [id number]

## 2024-01-02 NOTE — DISCHARGE INSTRUCTIONS
You are seen in the department due to ongoing difficulty breathing and cough as well as abdominal pain and back pain.  You had labs here, x-ray, and CT scans.  The cause of your pain at this point in time is unclear, but could be related to musculoskeletal pain from your coughing.  There does not seem to be any evidence of pneumonia at this time, and most of your symptoms could be explained by a viral syndrome.  However you do have a large hiatal hernia which you have had for quite a while.  As this means that your stomach at times was up in your chest, this can increase your risk of reflux, and can also cause some issues with food moving through your lungs.  There is no signs that you are having anything that is being caused by your heart at this time.  However we do recommend that you follow-up with your primary care doctor and have reevaluation later this week if you continue to have symptoms.    Return to the emergency department with any worsening difficulty breathing, feeling as though you are going to pass out, or severe worsening abdominal pain.

## 2024-01-08 ENCOUNTER — PATIENT OUTREACH (OUTPATIENT)
Dept: ONCOLOGY | Facility: CLINIC | Age: 74
End: 2024-01-08
Payer: COMMERCIAL

## 2024-01-08 DIAGNOSIS — K44.9 HIATAL HERNIA: Primary | ICD-10-CM

## 2024-01-08 NOTE — PROGRESS NOTES
New Patient Oncology Nurse Navigator Note     Referring provider: Dr. Adri Groves    Referring Clinic/Organization: [facility]  Referred to: Thoracic Surgery  Requested provider (if applicable): First available - did not specify   Referral Received: 01/08/24       Evaluation for :   Diagnosis   K44.9 (ICD-10-CM) - Hiatal hernia      Note:  Additional Information:  large hiatal hernia, symptomatic    Clinical History (per Nurse review of records provided):      01/01/2024 CTA Chest Abdomen Pelvis w/ contrast (bookmarked) showed:   IMPRESSION:   1. Large paraesophageal hiatal hernia. No evidence of aortic  dissection.  2. Diverticulosis with no findings suggestive of acute diverticulitis.    Clinical Assessment / Barriers to Care (Per Nurse):    Never smoker  Records Location: Epic   Records Needed: Outside CT chest images from 2017 from [facility]   Additional testing needed prior to consult: CT Chest     Writer called Dequan and discussed the referral with her. She reports she was not aware of the referral but is in agreement with schedule a consult and CT chest. Will have NPS call Dequan to schedule appts. All questions answered.     JOSE PinedaN, RN, OCN  [facility] Oncology Nurse Navigator  [phone] / [phone]

## 2024-01-12 ENCOUNTER — PRE VISIT (OUTPATIENT)
Dept: SURGERY | Facility: CLINIC | Age: 74
End: 2024-01-12
Payer: COMMERCIAL

## 2024-01-12 NOTE — TELEPHONE ENCOUNTER
RECORDS STATUS - ALL OTHER DIAGNOSIS      RECORDS RECEIVED FROM: [facility], [facility], Epic   DATE RECEIVED:    NOTES STATUS DETAILS   OFFICE NOTE from referring provider Yvrose Groves via ED visit 1/1/24   DISCHARGE REPORT from the ER Epic/MAT [facility]   OPERATIVE REPORT Epic/CE - [facility]  5/1/19: EGD    [facility]  8/4/05: Laparoscopic Cholecystectomy   MEDICATION LIST CE [facility]   LABS     ANYTHING RELATED TO DIAGNOSIS Epic 1/1/24   IMAGING (NEED IMAGES & REPORT)     CT SCANS Requested 1/12   12/26/17   XR Requested 1/12 [facility]  12/29/23, 10/12/23, 1/24/16, 4/3/15    [facility]  6/1/22, 1/24/16   ULTRASOUND Requested 1/12   9/3/20, 1/22/20, 5/7/19

## 2024-09-21 ENCOUNTER — APPOINTMENT (OUTPATIENT)
Dept: GENERAL RADIOLOGY | Facility: CLINIC | Age: 74
End: 2024-09-21
Attending: EMERGENCY MEDICINE
Payer: COMMERCIAL

## 2024-09-21 ENCOUNTER — HOSPITAL ENCOUNTER (EMERGENCY)
Facility: CLINIC | Age: 74
Discharge: HOME | End: 2024-09-21
Attending: EMERGENCY MEDICINE | Admitting: EMERGENCY MEDICINE
Payer: COMMERCIAL

## 2024-09-21 VITALS
DIASTOLIC BLOOD PRESSURE: 90 MMHG | SYSTOLIC BLOOD PRESSURE: 169 MMHG | WEIGHT: 161 LBS | HEART RATE: 99 BPM | OXYGEN SATURATION: 98 % | TEMPERATURE: 97.9 F | HEIGHT: 59 IN | BODY MASS INDEX: 32.46 KG/M2 | RESPIRATION RATE: 17 BRPM

## 2024-09-21 DIAGNOSIS — J18.9 PNEUMONIA DUE TO INFECTIOUS ORGANISM, UNSPECIFIED LATERALITY, UNSPECIFIED PART OF LUNG: ICD-10-CM

## 2024-09-21 DIAGNOSIS — K21.00 GASTROESOPHAGEAL REFLUX DISEASE WITH ESOPHAGITIS WITHOUT HEMORRHAGE: ICD-10-CM

## 2024-09-21 DIAGNOSIS — E87.6 HYPOKALEMIA: ICD-10-CM

## 2024-09-21 LAB
ANION GAP SERPL CALCULATED.3IONS-SCNC: 12 MMOL/L (ref 7–15)
BASOPHILS # BLD AUTO: 0 10E3/UL (ref 0–0.2)
BASOPHILS NFR BLD AUTO: 1 %
BUN SERPL-MCNC: 10.9 MG/DL (ref 8–23)
CALCIUM SERPL-MCNC: 8.8 MG/DL (ref 8.8–10.4)
CHLORIDE SERPL-SCNC: 99 MMOL/L (ref 98–107)
CREAT SERPL-MCNC: 0.53 MG/DL (ref 0.51–0.95)
EGFRCR SERPLBLD CKD-EPI 2021: >90 ML/MIN/1.73M2
EOSINOPHIL # BLD AUTO: 0.3 10E3/UL (ref 0–0.7)
EOSINOPHIL NFR BLD AUTO: 3 %
ERYTHROCYTE [DISTWIDTH] IN BLOOD BY AUTOMATED COUNT: 15.6 % (ref 10–15)
FLUAV RNA SPEC QL NAA+PROBE: NEGATIVE
FLUBV RNA RESP QL NAA+PROBE: NEGATIVE
GLUCOSE SERPL-MCNC: 139 MG/DL (ref 70–99)
HCO3 SERPL-SCNC: 22 MMOL/L (ref 22–29)
HCT VFR BLD AUTO: 36.5 % (ref 35–47)
HGB BLD-MCNC: 11.8 G/DL (ref 11.7–15.7)
IMM GRANULOCYTES # BLD: 0.1 10E3/UL
IMM GRANULOCYTES NFR BLD: 1 %
LYMPHOCYTES # BLD AUTO: 2.6 10E3/UL (ref 0.8–5.3)
LYMPHOCYTES NFR BLD AUTO: 30 %
MCH RBC QN AUTO: 26.2 PG (ref 26.5–33)
MCHC RBC AUTO-ENTMCNC: 32.3 G/DL (ref 31.5–36.5)
MCV RBC AUTO: 81 FL (ref 78–100)
MONOCYTES # BLD AUTO: 0.6 10E3/UL (ref 0–1.3)
MONOCYTES NFR BLD AUTO: 7 %
NEUTROPHILS # BLD AUTO: 5.1 10E3/UL (ref 1.6–8.3)
NEUTROPHILS NFR BLD AUTO: 59 %
NRBC # BLD AUTO: 0 10E3/UL
NRBC BLD AUTO-RTO: 0 /100
PLATELET # BLD AUTO: 425 10E3/UL (ref 150–450)
POTASSIUM SERPL-SCNC: 3.3 MMOL/L (ref 3.4–5.3)
RBC # BLD AUTO: 4.5 10E6/UL (ref 3.8–5.2)
RSV RNA SPEC NAA+PROBE: NEGATIVE
SARS-COV-2 RNA RESP QL NAA+PROBE: NEGATIVE
SODIUM SERPL-SCNC: 133 MMOL/L (ref 135–145)
WBC # BLD AUTO: 8.6 10E3/UL (ref 4–11)

## 2024-09-21 PROCEDURE — 250N000009 HC RX 250: Performed by: EMERGENCY MEDICINE

## 2024-09-21 PROCEDURE — 71046 X-RAY EXAM CHEST 2 VIEWS: CPT

## 2024-09-21 PROCEDURE — 87637 SARSCOV2&INF A&B&RSV AMP PRB: CPT | Performed by: EMERGENCY MEDICINE

## 2024-09-21 PROCEDURE — 99284 EMERGENCY DEPT VISIT MOD MDM: CPT | Mod: 25 | Performed by: EMERGENCY MEDICINE

## 2024-09-21 PROCEDURE — 36415 COLL VENOUS BLD VENIPUNCTURE: CPT | Performed by: EMERGENCY MEDICINE

## 2024-09-21 PROCEDURE — 80048 BASIC METABOLIC PNL TOTAL CA: CPT | Performed by: EMERGENCY MEDICINE

## 2024-09-21 PROCEDURE — 250N000012 HC RX MED GY IP 250 OP 636 PS 637: Performed by: EMERGENCY MEDICINE

## 2024-09-21 PROCEDURE — 250N000013 HC RX MED GY IP 250 OP 250 PS 637: Performed by: EMERGENCY MEDICINE

## 2024-09-21 PROCEDURE — 94640 AIRWAY INHALATION TREATMENT: CPT | Performed by: EMERGENCY MEDICINE

## 2024-09-21 PROCEDURE — 99284 EMERGENCY DEPT VISIT MOD MDM: CPT | Performed by: EMERGENCY MEDICINE

## 2024-09-21 PROCEDURE — 85025 COMPLETE CBC W/AUTO DIFF WBC: CPT | Performed by: EMERGENCY MEDICINE

## 2024-09-21 PROCEDURE — 96374 THER/PROPH/DIAG INJ IV PUSH: CPT | Performed by: EMERGENCY MEDICINE

## 2024-09-21 RX ORDER — LEVOFLOXACIN 750 MG/1
750 TABLET, FILM COATED ORAL DAILY
Qty: 5 TABLET | Refills: 0 | Status: SHIPPED | OUTPATIENT
Start: 2024-09-21 | End: 2024-09-26

## 2024-09-21 RX ORDER — IPRATROPIUM BROMIDE AND ALBUTEROL SULFATE 2.5; .5 MG/3ML; MG/3ML
3 SOLUTION RESPIRATORY (INHALATION) ONCE
Status: COMPLETED | OUTPATIENT
Start: 2024-09-21 | End: 2024-09-21

## 2024-09-21 RX ORDER — POTASSIUM CHLORIDE 1.5 G/1.58G
20 POWDER, FOR SOLUTION ORAL ONCE
Status: COMPLETED | OUTPATIENT
Start: 2024-09-21 | End: 2024-09-21

## 2024-09-21 RX ORDER — PREDNISONE 20 MG/1
40 TABLET ORAL ONCE
Status: COMPLETED | OUTPATIENT
Start: 2024-09-21 | End: 2024-09-21

## 2024-09-21 RX ORDER — ALBUTEROL SULFATE 90 UG/1
2 AEROSOL, METERED RESPIRATORY (INHALATION) EVERY 6 HOURS PRN
Qty: 18 G | Refills: 0 | Status: SHIPPED | OUTPATIENT
Start: 2024-09-21

## 2024-09-21 RX ORDER — SUCRALFATE 1 G/1
1 TABLET ORAL 4 TIMES DAILY
COMMUNITY
Start: 2024-09-15 | End: 2024-09-22

## 2024-09-21 RX ORDER — IPRATROPIUM BROMIDE AND ALBUTEROL SULFATE 2.5; .5 MG/3ML; MG/3ML
3 SOLUTION RESPIRATORY (INHALATION) ONCE
Status: DISCONTINUED | OUTPATIENT
Start: 2024-09-21 | End: 2024-09-21

## 2024-09-21 RX ORDER — MAGNESIUM HYDROXIDE/ALUMINUM HYDROXICE/SIMETHICONE 120; 1200; 1200 MG/30ML; MG/30ML; MG/30ML
15 SUSPENSION ORAL 4 TIMES DAILY PRN
COMMUNITY
Start: 2023-12-09

## 2024-09-21 RX ADMIN — POTASSIUM CHLORIDE 20 MEQ: 1.5 POWDER, FOR SOLUTION ORAL at 19:19

## 2024-09-21 RX ADMIN — PREDNISONE 40 MG: 20 TABLET ORAL at 17:25

## 2024-09-21 RX ADMIN — PANTOPRAZOLE SODIUM 40 MG: 40 INJECTION, POWDER, FOR SOLUTION INTRAVENOUS at 18:27

## 2024-09-21 RX ADMIN — IPRATROPIUM BROMIDE AND ALBUTEROL SULFATE 3 ML: .5; 3 SOLUTION RESPIRATORY (INHALATION) at 18:10

## 2024-09-21 ASSESSMENT — COLUMBIA-SUICIDE SEVERITY RATING SCALE - C-SSRS
2. HAVE YOU ACTUALLY HAD ANY THOUGHTS OF KILLING YOURSELF IN THE PAST MONTH?: NO
1. IN THE PAST MONTH, HAVE YOU WISHED YOU WERE DEAD OR WISHED YOU COULD GO TO SLEEP AND NOT WAKE UP?: NO
6. HAVE YOU EVER DONE ANYTHING, STARTED TO DO ANYTHING, OR PREPARED TO DO ANYTHING TO END YOUR LIFE?: NO

## 2024-09-21 ASSESSMENT — ACTIVITIES OF DAILY LIVING (ADL)
ADLS_ACUITY_SCORE: 35
ADLS_ACUITY_SCORE: 33
ADLS_ACUITY_SCORE: 33

## 2024-09-21 NOTE — ED PROVIDER NOTES
Oh 16 yes oh ED Provider Note  [facility]      History     Chief Complaint   Patient presents with    Cough     Patient presents due to cough; with cough she feels as though her hernia is getting worse. Patient would like to check on the size of hernia. Patient also reports sore throat and upper abdominal pain. Patient reports 8/10 burning pain. Patient feels like something is stuck in throat. Patient was vomiting 4 days ago and was seen in emergency room.     HPI  Dequan Capps is a 74 year old female pmh GERD, hiatal hernia, chronic hep B, past H pylori infection who presents with burning chest/abdominal pain, cough, and wheezing over the past 13 days.    She came to the hospital on September 15 for similar symptoms - describes burning pain in neck and belly that feels like her usual GERD pain but worse. Has not missed GI medications since she was in ED a few days ago.    Vomiting blood - it's black blood, very stretchy and bubbly, acidic. This is a problem off and on for years, but it's been thirteen days since this episode. Vomiting blood has happened 3 times the past 13 days. They gave her medications (GI cocktail) in the ED and the vomiting blood stopped but her other symptoms are persisting.    She's wheezing and coughing intermittently, feels like she may have a little difficulty breathing but nothing significant, is able to do her daily activities. Wheezing and coughing have been going on the past 13 days. When reflux gets worse     Wants to do chest x-ray to see what's wrong with her lungs and her chest and also to see if her abdominal hernia has gotten worse. Was recently told her hernia was bigger. Doesn't go to a gastrointestinal doctor, has had endoscopy once or twice she thinks.    Endorses pain in throat from acid, minor constipation.    Denies fever, chills, diarrhea, blood in stool, dysuria, urgency, hematuria, pain in other parts of body including legs, swelling, changes  "in vision, changes in hearing.    Past Medical History  Past Medical History:   Diagnosis Date    Gastro-oesophageal reflux disease     Hypertension      Past Surgical History:   Procedure Laterality Date    CHOLECYSTECTOMY  08/04/2005     albuterol (PROAIR HFA/PROVENTIL HFA/VENTOLIN HFA) 108 (90 Base) MCG/ACT inhaler  alum & mag hydroxide-simethicone (TOÑO-LANTA) 200-200-20 MG/5ML SUSP suspension  levofloxacin (LEVAQUIN) 750 MG tablet  sucralfate (CARAFATE) 1 GM tablet  acetaminophen (TYLENOL) 500 MG tablet  amLODIPine (NORVASC) 5 MG tablet  Cholecalciferol (VITAMIN D) 2000 UNITS tablet  cyclobenzaprine (FLEXERIL) 5 MG tablet  Multiple Vitamin (DAILY ENOC) TABS  pantoprazole (PROTONIX) 40 MG EC tablet  polyvinyl alcohol (ARTIFICIAL TEARS) 1.4 % ophthalmic solution      Allergies   Allergen Reactions    H2 Antagonists Hives     Pt states is not allergic    Amitriptyline Itching     Family History  Family History   Problem Relation Age of Onset    Family History Negative Other      Social History   Social History     Tobacco Use    Smoking status: Never    Smokeless tobacco: Never   Substance Use Topics    Alcohol use: No    Drug use: No      A medically appropriate review of systems was performed with pertinent positives and negatives noted in the HPI, and all other systems negative.    Physical Exam   BP: 132/84  Pulse: 78  Temp: 97.9  F (36.6  C)  Resp: 16  Height: 149.9 cm (4' 11\")  Weight: 73 kg (161 lb)  SpO2: 99 %  Physical Exam  Constitutional:       General: She is not in acute distress.     Appearance: Normal appearance. She is not ill-appearing or diaphoretic.   HENT:      Head: Normocephalic and atraumatic.   Cardiovascular:      Rate and Rhythm: Normal rate and regular rhythm.      Heart sounds: Normal heart sounds.   Pulmonary:      Effort: Pulmonary effort is normal.      Breath sounds: Wheezing present.      Comments: Occasional expiratory wheeze  Abdominal:      General: Bowel sounds are normal. " There is no distension.      Palpations: Abdomen is soft. There is no hepatomegaly, splenomegaly or mass.      Tenderness: There is no rebound.      Comments: Mild tenderness to deep palpation RUQ/epigastric   Musculoskeletal:         General: No swelling or tenderness.   Skin:     General: Skin is warm and dry.   Neurological:      Mental Status: She is alert.         Please  ED Course, Procedures, & Data      Procedures                 Results for orders placed or performed during the hospital encounter of 09/21/24   XR Chest 2 Views     Status: None    Narrative    EXAM: XR CHEST 2 VIEWS  LOCATION: [facility]  DATE: 9/21/2024    INDICATION: cough, fever  COMPARISON: Chest radiograph and CTA chest 1/1/2024.      Impression    IMPRESSION: Upper limits of normal heart size. Hiatal hernia. Bilateral reticulonodular opacities. There are also some consolidative opacities within the right mid and lower lung. Findings likely represent multifocal infection. No pleural effusion or   pneumothorax. Clips overlie the right upper quadrant abdomen.   Symptomatic Influenza A/B, RSV, & SARS-CoV2 PCR (COVID-19) Nasopharyngeal     Status: Normal    Specimen: Nasopharyngeal; Swab   Result Value Ref Range    Influenza A PCR Negative Negative    Influenza B PCR Negative Negative    RSV PCR Negative Negative    SARS CoV2 PCR Negative Negative    Narrative    Testing was performed using the Xpert Xpress CoV2/Flu/RSV Assay on the [employer] GeneXpert Instrument. This test should be ordered for the detection of SARS-CoV-2, influenza, and RSV viruses in individuals who meet clinical and/or epidemiological criteria. Test performance is unknown in asymptomatic patients. This test is for in vitro diagnostic use under the FDA EUA for laboratories certified under CLIA to perform high or moderate complexity testing. This test has not been FDA cleared or approved. A negative result does not rule out the  presence of PCR inhibitors in the specimen or target RNA in concentration below the limit of detection for the assay. If only one viral target is positive but coinfection with multiple targets is suspected, the sample should be re-tested with another FDA cleared, approved, or authorized test, if coinfection would change clinical management. This test was validated by the [facility]. These laboratories are certified under the Clinical Laboratory Improvement Amendments of 1988 (CLIA-88) as qualified to perform high complexity laboratory testing.   Basic metabolic panel     Status: Abnormal   Result Value Ref Range    Sodium 133 (L) 135 - 145 mmol/L    Potassium 3.3 (L) 3.4 - 5.3 mmol/L    Chloride 99 98 - 107 mmol/L    Carbon Dioxide (CO2) 22 22 - 29 mmol/L    Anion Gap 12 7 - 15 mmol/L    Urea Nitrogen 10.9 8.0 - 23.0 mg/dL    Creatinine 0.53 0.51 - 0.95 mg/dL    GFR Estimate >90 >60 mL/min/1.73m2    Calcium 8.8 8.8 - 10.4 mg/dL    Glucose 139 (H) 70 - 99 mg/dL   CBC with platelets and differential     Status: Abnormal   Result Value Ref Range    WBC Count 8.6 4.0 - 11.0 10e3/uL    RBC Count 4.50 3.80 - 5.20 10e6/uL    Hemoglobin 11.8 11.7 - 15.7 g/dL    Hematocrit 36.5 35.0 - 47.0 %    MCV 81 78 - 100 fL    MCH 26.2 (L) 26.5 - 33.0 pg    MCHC 32.3 31.5 - 36.5 g/dL    RDW 15.6 (H) 10.0 - 15.0 %    Platelet Count 425 150 - 450 10e3/uL    % Neutrophils 59 %    % Lymphocytes 30 %    % Monocytes 7 %    % Eosinophils 3 %    % Basophils 1 %    % Immature Granulocytes 1 %    NRBCs per 100 WBC 0 <1 /100    Absolute Neutrophils 5.1 1.6 - 8.3 10e3/uL    Absolute Lymphocytes 2.6 0.8 - 5.3 10e3/uL    Absolute Monocytes 0.6 0.0 - 1.3 10e3/uL    Absolute Eosinophils 0.3 0.0 - 0.7 10e3/uL    Absolute Basophils 0.0 0.0 - 0.2 10e3/uL    Absolute Immature Granulocytes 0.1 <=0.4 10e3/uL    Absolute NRBCs 0.0 10e3/uL   CBC with platelets differential     Status: Abnormal    Narrative    The following orders were  created for panel order CBC with platelets differential.  Procedure                               Abnormality         Status                     ---------                               -----------         ------                     CBC with platelets and d...[[id number]]  Abnormal            Final result                 Please view results for these tests on the individual orders.     Medications   sodium chloride 0.9% BOLUS 1,000 mL (1,000 mLs Intravenous Not Given 9/21/24 1833)   predniSONE (DELTASONE) tablet 40 mg (40 mg Oral $Given 9/21/24 1725)   ipratropium - albuterol 0.5 mg/2.5 mg/3 mL (DUONEB) neb solution 3 mL (3 mLs Nebulization $Given 9/21/24 1810)   pantoprazole (PROTONIX) IV push injection 40 mg (40 mg Intravenous $Given 9/21/24 1827)   potassium chloride (KLOR-CON) Packet 20 mEq (20 mEq Oral $Given 9/21/24 1919)     Labs Ordered and Resulted from Time of ED Arrival to Time of ED Departure   BASIC METABOLIC PANEL - Abnormal       Result Value    Sodium 133 (*)     Potassium 3.3 (*)     Chloride 99      Carbon Dioxide (CO2) 22      Anion Gap 12      Urea Nitrogen 10.9      Creatinine 0.53      GFR Estimate >90      Calcium 8.8      Glucose 139 (*)    CBC WITH PLATELETS AND DIFFERENTIAL - Abnormal    WBC Count 8.6      RBC Count 4.50      Hemoglobin 11.8      Hematocrit 36.5      MCV 81      MCH 26.2 (*)     MCHC 32.3      RDW 15.6 (*)     Platelet Count 425      % Neutrophils 59      % Lymphocytes 30      % Monocytes 7      % Eosinophils 3      % Basophils 1      % Immature Granulocytes 1      NRBCs per 100 WBC 0      Absolute Neutrophils 5.1      Absolute Lymphocytes 2.6      Absolute Monocytes 0.6      Absolute Eosinophils 0.3      Absolute Basophils 0.0      Absolute Immature Granulocytes 0.1      Absolute NRBCs 0.0     INFLUENZA A/B, RSV, & SARS-COV2 PCR - Normal    Influenza A PCR Negative      Influenza B PCR Negative      RSV PCR Negative      SARS CoV2 PCR Negative       XR Chest 2 Views   Final  Result   IMPRESSION: Upper limits of normal heart size. Hiatal hernia. Bilateral reticulonodular opacities. There are also some consolidative opacities within the right mid and lower lung. Findings likely represent multifocal infection. No pleural effusion or    pneumothorax. Clips overlie the right upper quadrant abdomen.                 Assessment & Plan    Dequan Capps is a 74 y.o. female with a PMH of GERD and hiatal hernia who presents to the ED for evaluation of epigastric and chest burning, recent hematemesis, and cough and wheezing. Patient's presentation is similar to ED visit last week and prior presentations for acid reflux, and this remains the most likely etiology for at least some of her symptoms. CXR concerning for multifocal infection. Patient currently afebrile, not tachycardic, WBCs WNL, however, given ongoing SOB, intermittent wheezing, and radiographic findings, presentation consistent with pneumonia.    Other considerations include PE, pneumothorax, COVID-19 or other respiratory infection, or other. Workup includes BMP, CBC, resp panel, CXR.     Patient's Wells' criteria score sufficiently low to make PE unlikely (no clinical signs or symptoms of DVT, not tachycardic, no recent surgery or immobilization, no known malignancy).     Pneumothorax unlikely given equal bilateral breath sounds, no respiratory distress, not tachycardic, no evidence on CXR.     Respiratory panel ordered, negative for influenza A/B, RSV, COVID-19.    Due to patient's recent hematemesis, blood loss was a concern. Active GI bleed seems less likely as patient's hematemesis has stopped, and patient denies blood in stool, vital signs stable. CBC ordered - reassuring against active bleed, Hgb 11.8 (stable from most recent prior was 11.4 on 9/15).    To treat hypokalemia (3.3) found on BMP - potassium supplement    To treat patient's ongoing chest and abdominal pain - pantoprazole IV. Plan to follow up with outpatient GI.    To  treat pneumonia - send home with levofloxacin    To treat patient's wheezing and SOB - duoneb x1, prednisone, will send home with albuterol  inhaler.    At this time, patient is stable and workup has been reassuring against need for further workup. Treatment as above. Patient is ready for discharge.    I have reviewed the nursing notes. I have reviewed the findings, diagnosis, plan and need for follow up with the patient.    New Prescriptions    ALBUTEROL (PROAIR HFA/PROVENTIL HFA/VENTOLIN HFA) 108 (90 BASE) MCG/ACT INHALER    Inhale 2 puffs into the lungs every 6 hours as needed for shortness of breath, wheezing or cough.    LEVOFLOXACIN (LEVAQUIN) 750 MG TABLET    Take 1 tablet (750 mg) by mouth daily for 5 days.       Final diagnoses:   Pneumonia due to infectious organism, unspecified laterality, unspecified part of lung   Gastroesophageal reflux disease with esophagitis without hemorrhage   Hypokalemia       Kim Foster, MS4  [facility] EMERGENCY DEPARTMENT  9/21/2024    --    ED Attending Physician Attestation    I Iwona Gorman MD, cared for this patient with the Medical Student. I performed, or re-performed, the physical exam and medical decision-making. I have verified the accuracy of all the medical student findings and documentation above, and have edited as necessary.    Summary of HPI, PE, ED Course   Patient is a 74 year old female evaluated in the emergency department for concern for ongoing burning sensation in the top of her epigastric region.  Says that she has a history of GERD and has been taking her medications.  Patient had since her last ER visit her symptoms are improved but she still gets a sensation.  Also notes a cough.  She denies any abdominal pain.  Denies any recent dark tarry stools or any bloody vomit.  Patient did have a complaint of that when she is here her last ER visit was had none since her visit here in the ER.. Exam and ED course notable for no  acute abdominal tenderness.  Patient abdomen is soft and nontender.  Patient is well-appearing on exam.  Patient's hemoglobin is stable from prior and has not changed.  I do not suspect acute GI bleed of any sort.  We did obtain a chest x-ray that shows concern for some opacities.  Patient therefore be treated with a course of oral antibiotics.  Patient likely needs to follow-up for a EGD as an outpatient due to concern for ongoing hiatal hernia and reflux symptoms.  This was discussed with the patient and her family member.. After the completion of care in the emergency department, the patient was discharged.      Iwona Gorman MD  Emergency Medicine           Iwona Gorman MD  09/22/24 0110

## 2024-09-21 NOTE — ED TRIAGE NOTES
Patient presents due to cough; with cough she feels as though her hernia is getting worse. Patient would like to check on the size of hernia. Patient also reports sore throat and upper abdominal pain. Patient reports 8/10 burning pain. Patient feels like something is stuck in throat. Patient was vomiting 4 days ago and was seen in emergency room.     Triage Assessment (Adult)       Row Name 09/21/24 1639          Triage Assessment    Airway WDL WDL        Respiratory WDL    Respiratory WDL X;cough     Cough Frequency frequent     Cough Type dry        Skin Circulation/Temperature WDL    Skin Circulation/Temperature WDL WDL        Cardiac WDL    Cardiac WDL WDL        Peripheral/Neurovascular WDL    Peripheral Neurovascular WDL WDL        Cognitive/Neuro/Behavioral WDL    Cognitive/Neuro/Behavioral WDL WDL

## 2024-09-22 NOTE — DISCHARGE INSTRUCTIONS
You have a pneumonia that is likely causing your shortness of breath and cough.   Please take the antibiotics as prescribed.     Your blood work is stable. There is no signs of any blood loss.   Please continue taking your protonix and carafate for your stomach.     Your potassium is mildly low--please eat extra banana or oranges.     Please make an appointment to follow up with Your Primary Care Provider and GI Clinic (phone: [phone]) in 7-10 days.    Return to the ER if any other problems/concerns.